# Patient Record
Sex: FEMALE | Race: WHITE | NOT HISPANIC OR LATINO | Employment: OTHER | URBAN - METROPOLITAN AREA
[De-identification: names, ages, dates, MRNs, and addresses within clinical notes are randomized per-mention and may not be internally consistent; named-entity substitution may affect disease eponyms.]

---

## 2023-03-10 LAB — URINE CULTURE: NO GROWTH

## 2023-03-20 ENCOUNTER — OFFICE VISIT (OUTPATIENT)
Dept: PRIMARY CARE | Facility: CLINIC | Age: 78
End: 2023-03-20
Payer: MEDICARE

## 2023-03-20 VITALS
OXYGEN SATURATION: 98 % | TEMPERATURE: 98.3 F | BODY MASS INDEX: 19.16 KG/M2 | HEART RATE: 71 BPM | WEIGHT: 111.6 LBS | SYSTOLIC BLOOD PRESSURE: 128 MMHG | DIASTOLIC BLOOD PRESSURE: 76 MMHG

## 2023-03-20 DIAGNOSIS — G50.0 TRIGEMINUS NEURALGIA: Primary | ICD-10-CM

## 2023-03-20 PROBLEM — K57.30 DIVERTICULOSIS OF COLON: Status: ACTIVE | Noted: 2023-03-20

## 2023-03-20 PROBLEM — I34.1 MITRAL VALVE PROLAPSE SYNDROME: Status: ACTIVE | Noted: 2023-03-20

## 2023-03-20 PROBLEM — Z86.19 HISTORY OF SHINGLES: Status: ACTIVE | Noted: 2023-03-20

## 2023-03-20 PROBLEM — E55.9 VITAMIN D DEFICIENCY: Status: ACTIVE | Noted: 2023-03-20

## 2023-03-20 PROBLEM — N39.3 STRESS INCONTINENCE IN FEMALE: Status: ACTIVE | Noted: 2023-03-20

## 2023-03-20 PROBLEM — H04.123 DRY EYE SYNDROME OF BOTH EYES: Status: ACTIVE | Noted: 2023-03-20

## 2023-03-20 PROBLEM — M85.9 DISORDER OF BONE DENSITY AND STRUCTURE, UNSPECIFIED: Status: ACTIVE | Noted: 2023-03-20

## 2023-03-20 PROBLEM — U07.1 COVID-19: Status: ACTIVE | Noted: 2023-03-20

## 2023-03-20 PROBLEM — D75.89 MACROCYTOSIS: Status: ACTIVE | Noted: 2023-03-20

## 2023-03-20 PROBLEM — R39.9 UTI SYMPTOMS: Status: ACTIVE | Noted: 2023-03-20

## 2023-03-20 PROBLEM — Z86.0100 HISTORY OF COLON POLYPS: Status: ACTIVE | Noted: 2023-03-20

## 2023-03-20 PROBLEM — Z86.010 HISTORY OF COLON POLYPS: Status: ACTIVE | Noted: 2023-03-20

## 2023-03-20 PROBLEM — I10 HYPERTENSION, ESSENTIAL: Status: ACTIVE | Noted: 2023-03-20

## 2023-03-20 PROBLEM — N95.2 POSTMENOPAUSAL ATROPHIC VAGINITIS: Status: ACTIVE | Noted: 2023-03-20

## 2023-03-20 PROBLEM — Z86.79 H/O SUPRAVENTRICULAR TACHYCARDIA: Status: ACTIVE | Noted: 2023-03-20

## 2023-03-20 PROBLEM — H26.9 CATARACT, ACQUIRED: Status: ACTIVE | Noted: 2023-03-20

## 2023-03-20 PROBLEM — N39.0 RECURRENT UTI: Status: ACTIVE | Noted: 2023-03-20

## 2023-03-20 PROBLEM — K64.0 GRADE I HEMORRHOIDS: Status: ACTIVE | Noted: 2023-03-20

## 2023-03-20 PROBLEM — M47.816 LUMBAR SPONDYLOSIS: Status: ACTIVE | Noted: 2023-03-20

## 2023-03-20 PROCEDURE — 1159F MED LIST DOCD IN RCRD: CPT | Performed by: NURSE PRACTITIONER

## 2023-03-20 PROCEDURE — 1036F TOBACCO NON-USER: CPT | Performed by: NURSE PRACTITIONER

## 2023-03-20 PROCEDURE — 3074F SYST BP LT 130 MM HG: CPT | Performed by: NURSE PRACTITIONER

## 2023-03-20 PROCEDURE — 3078F DIAST BP <80 MM HG: CPT | Performed by: NURSE PRACTITIONER

## 2023-03-20 PROCEDURE — 99213 OFFICE O/P EST LOW 20 MIN: CPT | Performed by: NURSE PRACTITIONER

## 2023-03-20 RX ORDER — FLAXSEED OIL 1000 MG
1000 CAPSULE ORAL
COMMUNITY

## 2023-03-20 RX ORDER — LANOLIN ALCOHOL/MO/W.PET/CERES
1000 CREAM (GRAM) TOPICAL DAILY
COMMUNITY
Start: 2019-08-28

## 2023-03-20 RX ORDER — ACETAMINOPHEN 500 MG
50 TABLET ORAL EVERY OTHER DAY
COMMUNITY

## 2023-03-20 RX ORDER — CRANBERRY FRUIT 450 MG
450 TABLET ORAL
COMMUNITY
Start: 2021-10-06 | End: 2023-11-07 | Stop reason: ALTCHOICE

## 2023-03-20 RX ORDER — CALCIUM CARBONATE/VITAMIN D3 500-10/5ML
30 LIQUID (ML) ORAL DAILY
COMMUNITY
Start: 2021-01-27

## 2023-03-20 RX ORDER — MULTIVITAMIN
1 TABLET ORAL DAILY
COMMUNITY
End: 2023-11-07 | Stop reason: ALTCHOICE

## 2023-03-20 RX ORDER — NITROFURANTOIN 25; 75 MG/1; MG/1
100 CAPSULE ORAL
COMMUNITY
Start: 2022-04-25 | End: 2023-10-17

## 2023-03-20 RX ORDER — ACETAMINOPHEN 160 MG/5ML
200 SUSPENSION, ORAL (FINAL DOSE FORM) ORAL
COMMUNITY
Start: 2021-10-06

## 2023-03-20 RX ORDER — ESTRADIOL 0.1 MG/G
CREAM VAGINAL
COMMUNITY
Start: 2019-03-14

## 2023-03-20 RX ORDER — IBUPROFEN 100 MG/5ML
1000 SUSPENSION, ORAL (FINAL DOSE FORM) ORAL DAILY
COMMUNITY
Start: 2021-10-06

## 2023-03-20 RX ORDER — METOPROLOL SUCCINATE 50 MG/1
50 TABLET, EXTENDED RELEASE ORAL DAILY
COMMUNITY
Start: 2013-01-16 | End: 2023-10-06

## 2023-03-20 RX ORDER — GLUTATHIONE 100 %
POWDER (GRAM) MISCELLANEOUS
COMMUNITY
Start: 2021-10-06

## 2023-03-20 RX ORDER — CARBAMAZEPINE 100 MG/1
100 TABLET, CHEWABLE ORAL 2 TIMES DAILY
Qty: 60 TABLET | Refills: 3 | Status: SHIPPED | OUTPATIENT
Start: 2023-03-20 | End: 2023-04-21 | Stop reason: SDUPTHER

## 2023-03-20 NOTE — PROGRESS NOTES
"Subjective   Patient ID: Joanna Kendrick is a 77 y.o. female who presents for veins in face (Nerve in face bothersome/From forehead down along side of nose/Feels like an electric shock/Had this for 2 years on and off/).  HPI  Nothing visible  Radiates lateral to medial L eyebrow  Down side nose   Stops just short of her lip  Exacerbated by brushes her teeth and hits roof of her mouth  Relieved by pressure point above her eyebrow    Review of Systems    BP Readings from Last 3 Encounters:   03/20/23 128/76   11/18/22 109/71   10/27/22 133/68      Wt Readings from Last 3 Encounters:   03/20/23 50.6 kg (111 lb 9.6 oz)   11/18/22 51 kg (112 lb 8 oz)   10/27/22 52.6 kg (116 lb)      BMI: Estimated body mass index is 19.16 kg/m² as calculated from the following:    Height as of 10/27/22: 1.626 m (5' 4\").    Weight as of this encounter: 50.6 kg (111 lb 9.6 oz).    Objective   Physical Exam  Constitutional:       Appearance: Normal appearance.   HENT:      Head: Normocephalic and atraumatic.      Right Ear: Tympanic membrane normal.      Left Ear: Tympanic membrane normal.      Nose: Nose normal.      Mouth/Throat:      Mouth: Mucous membranes are moist.      Pharynx: Oropharynx is clear.   Eyes:      Extraocular Movements: Extraocular movements intact.      Conjunctiva/sclera: Conjunctivae normal.      Pupils: Pupils are equal, round, and reactive to light.   Cardiovascular:      Rate and Rhythm: Normal rate and regular rhythm.      Heart sounds: Normal heart sounds.   Pulmonary:      Effort: Pulmonary effort is normal.      Breath sounds: Normal breath sounds.   Abdominal:      Palpations: Abdomen is soft.   Musculoskeletal:         General: Normal range of motion.      Cervical back: Normal range of motion and neck supple.   Skin:     General: Skin is warm and dry.   Neurological:      General: No focal deficit present.      Mental Status: She is alert.      Cranial Nerves: No cranial nerve deficit.      Sensory: No " sensory deficit.      Motor: No weakness.      Coordination: Coordination normal.      Gait: Gait normal.      Deep Tendon Reflexes: Reflexes normal.   Psychiatric:         Mood and Affect: Mood normal.         Behavior: Behavior normal.         Assessment/Plan   Problem List Items Addressed This Visit       Trigeminus neuralgia - Primary     Dr Osborne saw her for same 9/2022  At that time sx had resolved  Per Dr Osborne rec, if sx return can treat with tegretol  R/B tegretol discussed today  Will start low dose 100 mg every day then to BID if needed  Would also rec fu with neurology if ineffective          Relevant Medications    carBAMazepine (TEGretol) 100 mg chewable tablet    Other Relevant Orders    Referral to Neurology

## 2023-03-20 NOTE — ASSESSMENT & PLAN NOTE
Dr Osborne saw her for same 3/2021  At that time sx had resolved  Per Dr Osborne rec, if sx return can treat with tegretol  R/B tegretol discussed today  Will start low dose 100 mg every day then to BID if needed  Would also rec fu with neurology if ineffective

## 2023-03-21 ENCOUNTER — TELEPHONE (OUTPATIENT)
Dept: PRIMARY CARE | Facility: CLINIC | Age: 78
End: 2023-03-21
Payer: MEDICARE

## 2023-03-21 NOTE — TELEPHONE ENCOUNTER
Pt left VM  Pt saw DG yesterday for nerve pain in face and was given Carbamazepine 100mg BID  Pharmacy did not have it this  yesterday but they are supposed to get it in this morning  Pt stated she is in a lot of pain- can not chew and wants to know if this medication will work quickly to relieve the pain  Please advise

## 2023-03-21 NOTE — TELEPHONE ENCOUNTER
Spoke with Pt    Pt said she got the RX and is feeling some relief  Pt stated she still needs help scheduling with the Neurologist  Pt stated yesterday they ( the neurologists) office was unable to help with scheduling while she was here and they never called her and she can not get through to them now.  Could you please call her [phone] and help her schedule with the neurologists office. She said she would prefer the female dr. PUENTE

## 2023-04-21 DIAGNOSIS — G50.0 TRIGEMINUS NEURALGIA: ICD-10-CM

## 2023-04-21 RX ORDER — CARBAMAZEPINE 100 MG/1
TABLET, CHEWABLE ORAL
Qty: 90 TABLET | Refills: 3 | Status: SHIPPED | OUTPATIENT
Start: 2023-04-21 | End: 2024-04-30 | Stop reason: SDUPTHER

## 2023-06-09 ENCOUNTER — TELEPHONE (OUTPATIENT)
Dept: PRIMARY CARE | Facility: CLINIC | Age: 78
End: 2023-06-09
Payer: MEDICARE

## 2023-06-09 NOTE — TELEPHONE ENCOUNTER
Pt called and stated her old referral /order for colonoscopy is   Asked for a new one to be placed  formatted

## 2023-06-15 ENCOUNTER — TELEPHONE (OUTPATIENT)
Dept: PRIMARY CARE | Facility: CLINIC | Age: 78
End: 2023-06-15
Payer: MEDICARE

## 2023-06-15 DIAGNOSIS — Z12.11 SCREENING FOR MALIGNANT NEOPLASM OF COLON: Primary | ICD-10-CM

## 2023-06-15 PROBLEM — Z00.00 ROUTINE ADULT HEALTH MAINTENANCE: Status: ACTIVE | Noted: 2023-06-15

## 2023-06-15 NOTE — TELEPHONE ENCOUNTER
Called and left a voicemail informing patient the referral has been placed and requested she call for assistance with scheduling if needed

## 2023-07-19 LAB — URINE CULTURE: NO GROWTH

## 2023-08-24 ENCOUNTER — HOSPITAL ENCOUNTER (OUTPATIENT)
Dept: DATA CONVERSION | Facility: HOSPITAL | Age: 78
End: 2023-08-24
Attending: PAIN MEDICINE | Admitting: PAIN MEDICINE
Payer: MEDICARE

## 2023-08-24 DIAGNOSIS — G50.0 TRIGEMINAL NEURALGIA: ICD-10-CM

## 2023-08-29 PROBLEM — N39.0 UTI (URINARY TRACT INFECTION): Status: ACTIVE | Noted: 2023-08-29

## 2023-08-29 PROBLEM — E78.2 HYPERLIPIDEMIA, MIXED: Status: ACTIVE | Noted: 2023-08-29

## 2023-08-29 PROBLEM — H61.23 BILATERAL IMPACTED CERUMEN: Status: ACTIVE | Noted: 2023-08-29

## 2023-08-29 PROBLEM — I10 HYPERTENSION, ESSENTIAL: Status: RESOLVED | Noted: 2023-03-20 | Resolved: 2023-08-29

## 2023-08-29 PROBLEM — B02.9 SHINGLES: Status: ACTIVE | Noted: 2023-08-29

## 2023-08-29 PROBLEM — M85.80 OSTEOPENIA: Status: ACTIVE | Noted: 2023-08-29

## 2023-08-29 PROBLEM — Z79.2 PROPHYLACTIC ANTIBIOTIC: Status: ACTIVE | Noted: 2023-08-29

## 2023-08-29 PROBLEM — R30.0 DYSURIA: Status: ACTIVE | Noted: 2023-08-29

## 2023-08-29 PROBLEM — R79.89 ELEVATED TSH: Status: ACTIVE | Noted: 2023-08-29

## 2023-08-29 RX ORDER — CYCLOSPORINE 0.5 MG/ML
EMULSION OPHTHALMIC
COMMUNITY
Start: 2019-01-23 | End: 2023-10-17

## 2023-08-29 RX ORDER — BENZONATATE 200 MG/1
200 CAPSULE ORAL 3 TIMES DAILY PRN
COMMUNITY
Start: 2022-11-23 | End: 2023-10-17

## 2023-08-29 RX ORDER — MELOXICAM 15 MG/1
15 TABLET ORAL
COMMUNITY
Start: 2021-04-09 | End: 2023-11-07 | Stop reason: ALTCHOICE

## 2023-08-29 RX ORDER — CIPROFLOXACIN 250 MG/1
250 TABLET, FILM COATED ORAL EVERY 12 HOURS
COMMUNITY
Start: 2023-02-10 | End: 2023-10-17

## 2023-08-29 RX ORDER — NITROFURANTOIN (MACROCRYSTALS) 100 MG/1
100 CAPSULE ORAL 2 TIMES DAILY
COMMUNITY
Start: 2023-02-06 | End: 2023-10-17

## 2023-08-29 RX ORDER — CIPROFLOXACIN 500 MG/1
1 TABLET ORAL EVERY 12 HOURS
COMMUNITY
Start: 2022-04-22 | End: 2023-10-17

## 2023-08-29 RX ORDER — AZITHROMYCIN 250 MG/1
TABLET, FILM COATED ORAL
COMMUNITY
Start: 2022-10-27 | End: 2023-10-17

## 2023-08-29 RX ORDER — CLINDAMYCIN HYDROCHLORIDE 300 MG/1
CAPSULE ORAL
COMMUNITY
Start: 2014-11-04 | End: 2023-10-17

## 2023-08-29 RX ORDER — SELENIUM SULFIDE 22.5 MG/ML
SHAMPOO TOPICAL
COMMUNITY
Start: 2022-10-27 | End: 2023-11-07 | Stop reason: ALTCHOICE

## 2023-08-29 RX ORDER — DIAZEPAM 2 MG/1
TABLET ORAL
COMMUNITY
Start: 2023-04-03 | End: 2023-10-17

## 2023-08-29 RX ORDER — NITROFURANTOIN (MACROCRYSTALS) 100 MG/1
CAPSULE ORAL
COMMUNITY
Start: 2014-10-31 | End: 2023-10-17

## 2023-08-29 RX ORDER — ZOLEDRONIC ACID 5 MG/100ML
INJECTION, SOLUTION INTRAVENOUS
COMMUNITY
Start: 2022-01-10

## 2023-08-29 RX ORDER — CEPHALEXIN 250 MG/1
250 CAPSULE ORAL
COMMUNITY
Start: 2022-10-20 | End: 2023-10-17

## 2023-10-01 NOTE — OP NOTE
PROCEDURE DETAILS      Postoperative Diagnosis:  Trigeminal neuralgia  Surgeon: Ann Braga  Resident/Fellow/Other Assistant: Nirali Medrano    Procedure:  1. Left TRIGEMINAL NB no sed g50.0 43856    Anesthesia: No anesthesiologist associated with this case  Estimated Blood Loss: 0  Findings: none         Operative Report:     Operation  Trigeminal nerve block.    Surgical Indications  Patient is here today to receive a left-sided trigeminal nerve block to assist with left-sided trigeminal neuralgia.    Operative Report  Patient was taken to the procedure room, was placed into the supine positioning. The [variable] facial area was prepped and draped sterilely in the normal fashion. The skin and subcutaneous tissue was anesthetized with 1% lidocaine at the site of the  mandibular notch. A 22-gauge spinal needle was introduced under fluoroscopic guidance, through the mandibular notch until it was in approximation to the trigeminal ganglia. Negative aspiration of heme and CSF, and air was demonstrated. Then the patient  received 3 mL of 0.5% bupivacaine with 20 mg of Depo-Medrol. The patient tolerated the procedure well, was recovered first a sufficient amount of time and then was discharged home in stable condition. The patient was advised to [facility] to assess their improvement on an as-needed basis.    Thank you for allowing me to participate in the care of this patient.                        Attestation:   Note Completion:  Attending Attestation I performed the procedure without a resident         Electronic Signatures:  Ann Braga)  (Signed 24-Aug-2023 14:23)   Authored: Post-Operative Note, Chart Review, Note Completion      Last Updated: 24-Aug-2023 14:23 by Ann Braga)

## 2023-10-02 ENCOUNTER — ANCILLARY PROCEDURE (OUTPATIENT)
Dept: RADIOLOGY | Facility: CLINIC | Age: 78
End: 2023-10-02
Payer: MEDICARE

## 2023-10-02 DIAGNOSIS — Z13.820 ENCOUNTER FOR SCREENING FOR OSTEOPOROSIS: ICD-10-CM

## 2023-10-02 DIAGNOSIS — Z78.0 ASYMPTOMATIC MENOPAUSAL STATE: ICD-10-CM

## 2023-10-02 PROCEDURE — 77080 DXA BONE DENSITY AXIAL: CPT | Performed by: RADIOLOGY

## 2023-10-02 PROCEDURE — 77080 DXA BONE DENSITY AXIAL: CPT

## 2023-10-05 ENCOUNTER — ANESTHESIA EVENT (OUTPATIENT)
Dept: GASTROENTEROLOGY | Facility: EXTERNAL LOCATION | Age: 78
End: 2023-10-05

## 2023-10-05 ENCOUNTER — ANESTHESIA (OUTPATIENT)
Dept: GASTROENTEROLOGY | Facility: EXTERNAL LOCATION | Age: 78
End: 2023-10-05

## 2023-10-05 ENCOUNTER — HOSPITAL ENCOUNTER (OUTPATIENT)
Dept: GASTROENTEROLOGY | Facility: EXTERNAL LOCATION | Age: 78
Discharge: HOME | End: 2023-10-05
Payer: MEDICARE

## 2023-10-05 VITALS
SYSTOLIC BLOOD PRESSURE: 130 MMHG | OXYGEN SATURATION: 100 % | HEART RATE: 80 BPM | TEMPERATURE: 97.7 F | DIASTOLIC BLOOD PRESSURE: 75 MMHG | RESPIRATION RATE: 20 BRPM

## 2023-10-05 DIAGNOSIS — Z86.79 PERSONAL HISTORY OF OTHER DISEASES OF THE CIRCULATORY SYSTEM: ICD-10-CM

## 2023-10-05 DIAGNOSIS — Z12.11 ENCOUNTER FOR SCREENING FOR MALIGNANT NEOPLASM OF COLON: Primary | ICD-10-CM

## 2023-10-05 DIAGNOSIS — D12.2 ADENOMATOUS POLYP OF ASCENDING COLON: ICD-10-CM

## 2023-10-05 PROCEDURE — 45378 DIAGNOSTIC COLONOSCOPY: CPT | Performed by: INTERNAL MEDICINE

## 2023-10-05 RX ORDER — LIDOCAINE HYDROCHLORIDE 20 MG/ML
INJECTION, SOLUTION INFILTRATION; PERINEURAL AS NEEDED
Status: DISCONTINUED | OUTPATIENT
Start: 2023-10-05 | End: 2023-10-05

## 2023-10-05 RX ORDER — SODIUM CHLORIDE 9 MG/ML
30 INJECTION, SOLUTION INTRAVENOUS CONTINUOUS
Status: DISCONTINUED | OUTPATIENT
Start: 2023-10-05 | End: 2023-10-20 | Stop reason: HOSPADM

## 2023-10-05 RX ORDER — PROPOFOL 10 MG/ML
INJECTION, EMULSION INTRAVENOUS AS NEEDED
Status: DISCONTINUED | OUTPATIENT
Start: 2023-10-05 | End: 2023-10-05

## 2023-10-05 RX ADMIN — SODIUM CHLORIDE: 9 INJECTION, SOLUTION INTRAVENOUS at 08:16

## 2023-10-05 RX ADMIN — PROPOFOL 100 MG: 10 INJECTION, EMULSION INTRAVENOUS at 08:19

## 2023-10-05 RX ADMIN — PROPOFOL 50 MG: 10 INJECTION, EMULSION INTRAVENOUS at 08:30

## 2023-10-05 RX ADMIN — LIDOCAINE HYDROCHLORIDE 50 MG: 20 INJECTION, SOLUTION INFILTRATION; PERINEURAL at 08:19

## 2023-10-05 RX ADMIN — PROPOFOL 100 MG: 10 INJECTION, EMULSION INTRAVENOUS at 08:24

## 2023-10-05 SDOH — HEALTH STABILITY: MENTAL HEALTH: CURRENT SMOKER: 0

## 2023-10-05 ASSESSMENT — PAIN - FUNCTIONAL ASSESSMENT
PAIN_FUNCTIONAL_ASSESSMENT: 0-10

## 2023-10-05 ASSESSMENT — PAIN SCALES - GENERAL
PAIN_LEVEL: 0
PAINLEVEL_OUTOF10: 0 - NO PAIN

## 2023-10-05 ASSESSMENT — COLUMBIA-SUICIDE SEVERITY RATING SCALE - C-SSRS
2. HAVE YOU ACTUALLY HAD ANY THOUGHTS OF KILLING YOURSELF?: NO
6. HAVE YOU EVER DONE ANYTHING, STARTED TO DO ANYTHING, OR PREPARED TO DO ANYTHING TO END YOUR LIFE?: NO
1. IN THE PAST MONTH, HAVE YOU WISHED YOU WERE DEAD OR WISHED YOU COULD GO TO SLEEP AND NOT WAKE UP?: NO

## 2023-10-05 NOTE — ANESTHESIA POSTPROCEDURE EVALUATION
Patient: Joanna Kendrick    Procedure Summary       Date: 10/05/23 Room / Location: [facility]    Anesthesia Start: 0816 Anesthesia Stop: 0843    Procedure: COLONOSCOPY Diagnosis:       Encounter for screening for malignant neoplasm of colon      Encounter for screening for malignant neoplasm of colon    Scheduled Providers: Lia Emerson MD; Leann Cook RN Responsible Provider: SUNI Quijano    Anesthesia Type: MAC ASA Status: 2            Anesthesia Type: MAC    Vitals Value Taken Time   /59 10/05/23 0845   Temp 36.5 10/05/23 0845   Pulse 77 10/05/23 0845   Resp 20 10/05/23 0845   SpO2 100 10/05/23 0845       Anesthesia Post Evaluation    Patient location during evaluation: PACU  Patient participation: complete - patient participated  Level of consciousness: awake and alert  Pain score: 0  Pain management: satisfactory to patient  Multimodal analgesia pain management approach  Airway patency: patent  Cardiovascular status: acceptable and blood pressure returned to baseline  Respiratory status: acceptable  Hydration status: acceptable        There were no known notable events for this encounter.

## 2023-10-05 NOTE — Clinical Note
Patient tolerating procedure well and is comfortable with no complaints of pain. Vital signs stable.

## 2023-10-05 NOTE — DISCHARGE INSTRUCTIONS
Patient Instructions after a Colonoscopy      The anesthetics, sedatives or narcotics which were given to you today will be acting in your body for the next 24 hours, so you might feel a little sleepy or groggy.  This feeling should slowly wear off. Carefully read and follow the instructions.     You received sedation today:  - Do not drive or operate any machinery or power tools of any kind.   - No alcoholic beverages today, not even beer or wine.  - Do not make any important decisions or sign any legal documents.  - No over the counter medications that contain alcohol or that may cause drowsiness.  - Do not make any important decisions or sign any legal documents.    While it is common to experience mild to moderate abdominal distention, gas, or belching after your procedure, if any of these symptoms occur following discharge from the GI Lab or within one week of having your procedure, call the [facility] to be advised whether a visit to your nearest Urgent Care or Emergency Department is indicated.  Take this paper with you if you go.     - If you develop an allergic reaction to the medications that were given during your procedure such as difficulty breathing, rash, hives, severe nausea, vomiting or lightheadedness.  - If you experience chest pain, shortness of breath, severe abdominal pain, fevers and chills.  -If you develop signs and symptoms of bleeding such as blood in your spit, if your stools turn black, tarry, or bloody  - If you have not urinated within 8 hours following your procedure.  - If your IV site becomes painful, red, inflamed, or looks infected.    If you received a biopsy/polypectomy/sphincterotomy the following instructions apply below:    __ Do not use Aspirin containing products, non-steroidal medications or anti-coagulants for one week following your procedure. (Examples of these types of medications are: Advil, Arthrotec, Aleve, Coumadin, Ecotrin, Heparin, Ibuprofen,  Indocin, Motrin, Naprosyn, Nuprin, Plavix, Vioxx, and Voltarin, or their generic forms.  This list is not all-inclusive.  Check with your physician or pharmacist before resuming medications.)   __ Eat a soft diet today.  Avoid foods that are poorly digested for the next 24 hours.  These foods would include: nuts, beans, lettuce, red meats, and fried foods. Start with liquids and advance your diet as tolerated, gradually work up to eating solids.   __ Do not have a Barium Study or Enema for one week.    Your physician recommends the additional following instructions:    -You have a contact number available for emergencies. The signs and symptoms of potential delayed complications were discussed with you. You may return to normal activities tomorrow.  -Resume your previous diet.  -Continue your present medications.   -We are waiting for your pathology results.  -Your physician has recommended a repeat colonoscopy (date to be determined after pending pathology results are reviewed) for surveillance based on pathology results.  -The findings and recommendations have been discussed with you.  -The findings and recommendations were discussed with your family.  - Please see Medication Reconciliation Form for new medication/medications prescribed.       If you experience any problems or have any questions following discharge from the GI Lab, please call:  [phone]    No driving, no heavy machinery or power tools for 24 hours post procedure  No alcohol beverages today  No medications that contain alcohol or can make you groggy  No major life decisions today or signing legal documents today  Return to normal activity tomorrow  [facility] phone number [phone]

## 2023-10-05 NOTE — ANESTHESIA PREPROCEDURE EVALUATION
Patient: Joanna Kendrick    Procedure Information       Date/Time: 10/05/23 0800    Scheduled providers: Lia Emerson MD; Leann Cook RN    Procedure: COLONOSCOPY    Location: [facility]            Relevant Problems   Cardiovascular   (+) Hyperlipidemia, mixed   (+) Mitral valve prolapse syndrome   (+) SVT (supraventricular tachycardia)      /Renal   (+) Recurrent UTI      Neuro/Psych   (+) Trigeminus neuralgia      Musculoskeletal   (+) Lumbar spondylosis      Infectious Disease   (+) COVID-19   (+) Recurrent UTI   (+) Shingles       Clinical information reviewed:                   NPO Detail:  No data recorded     Physical Exam    Airway  Mallampati: II  TM distance: >3 FB  Neck ROM: full     Cardiovascular - normal exam     Dental   (+) implants       Pulmonary - normal exam     Abdominal - normal exam             Anesthesia Plan    ASA 2     MAC     The patient is not a current smoker.    Anesthetic plan and risks discussed with patient.  Use of blood products discussed with patient who consented to blood products.    Plan discussed with CRNA.

## 2023-10-05 NOTE — H&P
Outpatient Hospital Procedure    Patient Profile-Procedures  Initial Info  Patient Demographics  Name Joanna Kendrick  Date of Birth 1945  MRN [id number]  Address   [address] [id number] [place] [address]    Primary Phone Number [phone]  Secondary Phone Number    Jessa Art    Procedures   Colonoscopy      Indication:  Surveillance    Primary contact name and number   Extended Emergency Contact Information  Primary Emergency Contact: Pia Conway  Home Phone: [phone]  Relation: Child    General Health  Weight There were no vitals filed for this visit.  BMI There is no height or weight on file to calculate BMI.    Allergies  Allergies   Allergen Reactions    Penicillin G Anaphylaxis    Penicillins Anaphylaxis    Cipro [Ciprofloxacin Hcl] Unknown    Clindamycin Unknown    Sulfa (Sulfonamide Antibiotics) Hives       Past Medical History   Past Medical History:   Diagnosis Date    H/O bone density study 10/02/2023    Lowest T score -1.8.    10-year Fracture Risk: Major Osteoporotic Fracture  19.6 Hip Fracture                        11.6    Personal history of other medical treatment     H/O x-ray of lumbar spine    Personal history of other medical treatment     H/O CT scan    Personal history of other medical treatment     H/O echocardiogram    Personal history of other medical treatment     H/O CT scan of abdomen       Provider assessment  Diagnosis  Medication Reviewed - yes  Prior to Admission medications    Medication Sig Start Date End Date Taking? Authorizing Provider   ascorbic acid (Vitamin C) 1,000 mg tablet Take 1 tablet (1,000 mg) by mouth once daily. 10/6/21   Historical Provider, MD   aspirin 81 mg capsule Take 1 tablet by mouth once daily.    Historical Provider, MD   azithromycin (Zithromax) 250 mg tablet Take by mouth. 10/27/22   Historical Provider, MD   benzonatate (Tessalon) 200 mg capsule Take 1 capsule (200 mg) by mouth 3 times a  day as needed for cough. 11/23/22   Historical Provider, MD   carBAMazepine (TEGretol) 100 mg chewable tablet Take 1 tablet in the morning and take 2 tablets HS 4/21/23   Radhika Whitehead APRN-CNP   cephalexin (Keflex) 250 mg capsule Take 1 capsule (250 mg) by mouth. 10/20/22   Historical Provider, MD   cholecalciferol (Vitamin D-3) 50 mcg (2,000 unit) capsule Take 1 capsule (50 mcg) by mouth every other day.    Historical Provider, MD   ciprofloxacin (Cipro) 250 mg tablet Take 1 tablet (250 mg) by mouth every 12 hours. 2/10/23   Historical Provider, MD   ciprofloxacin (Cipro) 500 mg tablet Take 1 tablet (500 mg) by mouth every 12 hours. 4/22/22   Historical Provider, MD   clindamycin (Cleocin) 300 mg capsule Take by mouth. 11/4/14   Historical Provider, MD   coenzyme Q-10 200 mg capsule Take 1 capsule (200 mg) by mouth. TAKE AS DIRECTED. 10/6/21   Historical Provider, MD   cranberry fruit (cranberry) 450 mg tablet Take 450 mg by mouth. TAKE AS DIRECTED. 10/6/21   Historical Provider, MD   cranberry fruit concentrate 500 mg capsule Take by mouth.    Historical Provider, MD   cyanocobalamin (Vitamin B-12) 1,000 mcg tablet Take 1 tablet (1,000 mcg) by mouth once daily. 8/28/19   Historical Provider, MD   cycloSPORINE (Restasis MultiDose) 0.05 % drops Administer into affected eye(s). 1/23/19   Historical Provider, MD   diazePAM (Valium) 2 mg tablet Take by mouth. 4/3/23   Historical Provider, MD   estradiol (Estrace) 0.01 % (0.1 mg/gram) vaginal cream Insert into the vagina. APPLY A PEA-SIZED AMOUNT TO VAGINAL OPENING EVERY MONDAY, WEDNESDAY, AND FRIDAY EVENING. 3/14/19   Historical Provider, MD   flaxseed oiL 1,000 mg capsule Take 1 capsule (1,000 mg) by mouth. TAKE AS DIRECTED.    Historical Provider, MD   GLUTATHIONE ORAL Take 250 mg/day by mouth.    Historical Provider, MD   glutathione, bulk, 100 % powder Take by mouth. CAPSULE. USE AS DIRECTED. 10/6/21   Historical Provider, MD   hydrocortisone (PROCTOSOL HC TOP)  Apply 1 Application topically 4 times a day as needed.    Historical Provider, MD   L. acidophilus/Bifid. animalis 32 billion cell capsule Take by mouth.    Historical Provider, MD   LACTOBACILLUS ACIDOPHILUS ORAL Take by mouth.    Historical Provider, MD   meloxicam (Mobic) 15 mg tablet Take 1 tablet (15 mg) by mouth once daily. 4/9/21   Historical Provider, MD   METHYLSULFONYLMETHANE ORAL Take 1 Dose by mouth.    Historical Provider, MD   metoprolol succinate XL (Toprol-XL) 50 mg 24 hr tablet Take 1 tablet (50 mg) by mouth once daily. 1/16/13   Historical Provider, MD   multivitamin capsule Take 1 capsule by mouth once daily.    Historical Provider, MD   multivitamin with minerals (multivitamin with folic acid) tablet Take 1 tablet by mouth once daily.    Historical Provider, MD   nitrofurantoin (Macrodantin) 100 mg capsule Take 1 capsule (100 mg) by mouth 2 times a day. 2/6/23   Historical Provider, MD   nitrofurantoin (Macrodantin) 100 mg capsule Take by mouth. 10/31/14   Historical Provider, MD   nitrofurantoin, macrocrystal-monohydrate, (Macrobid) 100 mg capsule Take 1 capsule (100 mg) by mouth. Take one capsule within 12 hours of intercourse 4/25/22   Historical Provider, MD   NON FORMULARY Marine lipid concentrate 240-360-5 MG-MG-Unit caps    Historical Provider, MD   NON FORMULARY Cylospine in [place] 0.1-25% compound BID    Historical Provider, MD   psyllium (Metamucil) 3.4 gram packet Take by mouth. 10/6/21   Historical Provider, MD   selenium sulfide 2.25 % shampoo Use twice weekly 10/27/22   Historical Provider, MD   zinc gluconate-zinc picolinate 30 mg capsule Take 30 mg by mouth once daily. 1/27/21   Historical Provider, MD   zoledronic acid (Reclast) 5 mg/100 mL piggyback Infuse into a venous catheter. 1/10/22   Historical Provider, MD       This is my H&P    Physical Exam  Physical Exam  Constitutional:       Comments: Awake   HENT:      Head: Normocephalic.   Cardiovascular:      Rate and Rhythm:  Normal rate and regular rhythm.   Pulmonary:      Effort: Pulmonary effort is normal.      Breath sounds: Normal breath sounds.   Abdominal:      General: Bowel sounds are normal.      Palpations: Abdomen is soft.   Neurological:      Mental Status: She is alert.   Psychiatric:         Mood and Affect: Mood normal.           Oropharyngeal Classification II (hard and soft palate, upper portion of tonsils anduvula visible)  ASA PS Classification 2  Sedation Plan Deep  Procedure Plan - pre-procedural (re)assesment completed by physician:  discharge/transfer patient when discharge criteria met    Lia Emerson MD  10/5/2023 8:08 AM

## 2023-10-05 NOTE — Clinical Note
Patient tolerated the procedure well and is comfortable with no complaints of pain. Vital signs stable. Arousable prior to transport. Patient transported to PACU via stretcher. Handoff completed.

## 2023-10-06 RX ORDER — METOPROLOL SUCCINATE 50 MG/1
50 TABLET, EXTENDED RELEASE ORAL DAILY
Qty: 90 TABLET | Refills: 3 | Status: SHIPPED | OUTPATIENT
Start: 2023-10-06

## 2023-10-09 DIAGNOSIS — G50.0 TRIGEMINUS NEURALGIA: Primary | ICD-10-CM

## 2023-10-17 ENCOUNTER — HOSPITAL ENCOUNTER (OUTPATIENT)
Dept: RADIOLOGY | Facility: HOSPITAL | Age: 78
Discharge: HOME | End: 2023-10-17
Payer: MEDICARE

## 2023-10-17 ENCOUNTER — HOSPITAL ENCOUNTER (OUTPATIENT)
Dept: PAIN MEDICINE | Facility: CLINIC | Age: 78
Discharge: HOME | End: 2023-10-17
Payer: MEDICARE

## 2023-10-17 VITALS
DIASTOLIC BLOOD PRESSURE: 57 MMHG | RESPIRATION RATE: 20 BRPM | SYSTOLIC BLOOD PRESSURE: 113 MMHG | HEART RATE: 70 BPM | OXYGEN SATURATION: 99 % | TEMPERATURE: 98.2 F

## 2023-10-17 DIAGNOSIS — G50.0 TRIGEMINUS NEURALGIA: Primary | ICD-10-CM

## 2023-10-17 PROCEDURE — 2500000004 HC RX 250 GENERAL PHARMACY W/ HCPCS (ALT 636 FOR OP/ED): Performed by: PAIN MEDICINE

## 2023-10-17 PROCEDURE — 64640 INJECTION TREATMENT OF NERVE: CPT | Performed by: PAIN MEDICINE

## 2023-10-17 PROCEDURE — 7100000009 HC PHASE TWO TIME - INITIAL BASE CHARGE: Performed by: PAIN MEDICINE

## 2023-10-17 PROCEDURE — 76000 FLUOROSCOPY <1 HR PHYS/QHP: CPT

## 2023-10-17 PROCEDURE — G0500 MOD SEDAT ENDO SERVICE >5YRS: HCPCS | Performed by: PAIN MEDICINE

## 2023-10-17 PROCEDURE — 2500000005 HC RX 250 GENERAL PHARMACY W/O HCPCS

## 2023-10-17 PROCEDURE — 2500000004 HC RX 250 GENERAL PHARMACY W/ HCPCS (ALT 636 FOR OP/ED)

## 2023-10-17 PROCEDURE — 7100000010 HC PHASE TWO TIME - EACH INCREMENTAL 1 MINUTE: Performed by: PAIN MEDICINE

## 2023-10-17 PROCEDURE — 2500000005 HC RX 250 GENERAL PHARMACY W/O HCPCS: Performed by: PAIN MEDICINE

## 2023-10-17 RX ORDER — BUPIVACAINE HYDROCHLORIDE 5 MG/ML
10 INJECTION, SOLUTION PERINEURAL ONCE
Status: COMPLETED | OUTPATIENT
Start: 2023-10-17 | End: 2023-10-17

## 2023-10-17 RX ORDER — LIDOCAINE HYDROCHLORIDE 10 MG/ML
10 INJECTION INFILTRATION; PERINEURAL ONCE
Status: COMPLETED | OUTPATIENT
Start: 2023-10-17 | End: 2023-10-17

## 2023-10-17 RX ORDER — MIDAZOLAM HYDROCHLORIDE 1 MG/ML
1 INJECTION INTRAMUSCULAR; INTRAVENOUS
Status: DISCONTINUED | OUTPATIENT
Start: 2023-10-17 | End: 2023-10-20 | Stop reason: HOSPADM

## 2023-10-17 RX ADMIN — MIDAZOLAM HYDROCHLORIDE 1 MG: 1 INJECTION INTRAMUSCULAR; INTRAVENOUS at 10:46

## 2023-10-17 RX ADMIN — BUPIVACAINE HYDROCHLORIDE 50 MG: 5 INJECTION, SOLUTION PERINEURAL at 10:44

## 2023-10-17 RX ADMIN — LIDOCAINE HYDROCHLORIDE 100 MG: 10 INJECTION, SOLUTION INFILTRATION; PERINEURAL at 10:45

## 2023-10-17 NOTE — DISCHARGE INSTRUCTIONS
Resume previous diet  Resume previous medications unless otherwise directed by Dr. Braga  No tub bath or shower for 24 hours  Normal activity permitted.  Do not strain yourself. It is not unusual to feel increased pain at first.  This usually subsides in a few days.  Diabetic patients may notice increased blood sugar readings for a few days post-procedure. Check blood sugars per your usual schedule. Notify primary care doctor if readings do not return to normal 3-4 days after your procedure.  You may remove bandage after 24hrs.  Tenderness at the procedure site may be relieved with application of ice on and off for first 24 hours.  Then, ice or heat thereafter.  The deep anesthetic that was injected may cause numbness or weakness in an extremity.  Notify physician if you develop fever or chills greater than 100°F; persistent extremity numbness or weakness; nausea or bleeding at injection site (small spotting is normal)  Call 911 immediately if you develop difficulty breathing or shortness of breath   In case of any other emergency, go directly to the emergency department.     Radiofrequency Ablation for Pain    Why is this procedure done?  Radiofrequency ablation is a procedure that uses electrical current to destroy nerves that are causing pain. This may be used when other treatments have failed to give lasting relief. Heat, cool, or drugs may also be used to help destroy the nerves. This procedure stops the nerve from sending pain signals to the brain. Doctors may suggest radiofrequency ablation to treat pain in your back, hips, knees, or neck if your pain is not eased by other treatments.  What will the results be?  The goal is that you will have less pain after the procedure.  What happens before the procedure?  Your doctor will take your history. Talk to your doctor about:  All the drugs you are taking. Be sure to include all prescription and over-the-counter (OTC) drugs and herbal supplements. Tell the  doctor about any drug allergy. Bring a list of drugs you take with you.  Any bleeding problems. Be sure to tell your doctor if you are taking any drugs that may cause bleeding. Some of these are warfarin, rivaroxaban, apixaban, ticagrelor, clopidogrel, ketorolac, ibuprofen, naproxen, or aspirin. Certain vitamins and herbs, such as garlic and fish oil, may also add to the risk for bleeding. You may need to stop these drugs as well. Talk to your doctor about them.  If you need to stop eating or drinking before your procedure.  Your doctor will do an exam and may order:  Lab tests  X-rays  MRI or CT scan  Ultrasound  Electrocardiogram (ECG)  You may not be allowed to drive right away after the procedure. Ask a family member or a friend to drive you home.  What happens during the procedure?  Once you are in the procedure room, the staff may put an IV in your arm to give you fluids and drugs. You may be given a drug to help you relax.  The doctor will clean and numb the area.  The doctor may use a special kind of x-ray to guide a thin needle into the area where you feel pain. You may feel a slight tingling. This lets the doctor know they are in the right area.  Next, the doctor places a thin electrode through the needle and destroys a small amount of the nerve tissue.  Then the doctor takes out the needle and electrode and covers the area with a bandage.  What happens after the procedure?  You will go to the Recovery Room and the staff will watch you closely. Most often you are able to go home the same day.  What problems could happen?  Bleeding  Infection  Weakness or numbness  Nerve damage  Last Reviewed Date  2021-11-16

## 2023-10-17 NOTE — H&P
History Of Present Illness  Joanna Kendrick is a 78 y.o. female presenting with trigeminal neuralgia here today for radiofrequency ablation of the left trigeminal nerve     Past Medical History  Past Medical History:   Diagnosis Date    H/O bone density study 10/02/2023    Lowest T score -1.8.    10-year Fracture Risk: Major Osteoporotic Fracture  19.6 Hip Fracture                        11.6    Personal history of other medical treatment     H/O x-ray of lumbar spine    Personal history of other medical treatment     H/O CT scan    Personal history of other medical treatment     H/O echocardiogram    Personal history of other medical treatment     H/O CT scan of abdomen       Surgical History  Past Surgical History:   Procedure Laterality Date    BLADDER SURGERY  2014    Bladder Surgery    CATARACT EXTRACTION  08/15/2018    Cataract Surgery    COLONOSCOPY  2022    Complete Colonoscopy    GALLBLADDER SURGERY  10/04/2013    Gallbladder Surgery    HEMICOLECTOMY  08/15/2018    Hemicolectomy    HERNIA REPAIR  08/15/2018    Hernia Repair    OTHER SURGICAL HISTORY  2014    Mid-Urethral Sling Operation    OTHER SURGICAL HISTORY  2019    Blepharoplasty    OTHER SURGICAL HISTORY  2014    Laparoscopy Partial Colectomy        Social History  She reports that she has never smoked. She has never used smokeless tobacco. She reports that she does not drink alcohol. No history on file for drug use.    Family History  Family History   Problem Relation Name Age of Onset    Other (Pacemaker) Mother           age 92    Hip fracture Mother          s/p fall down stairs    Other (MVA-  young age) Father      Alcohol abuse Brother      Pancreatic cancer Brother           70    Cirrhosis Brother      Colon cancer Half-Sister          Allergies  Penicillin g, Penicillins, Cipro [ciprofloxacin hcl], Clindamycin, and Sulfa (sulfonamide antibiotics)    Review of Systems   All 13 systems were reviewed  and are within normal levels except as noted below or per HPI. Positive and pertinent negative responses are noted below or in the HPI   Denied any fever or chills. No weight loss and no night sweats. No cough or sputum production. No diarrhea   No constipation  No bladder and bowel incontinence and no other changes in bladder and bowel. No skin changes.   Denied opioids diversion and abuse and denies alcoholism. Denies overuse of  pain medications.   Physical Exam       Past medical history no interval changes has been noted    On physical examination    General   Alert, oriented x3 pleasant and cooperative. Does not look in any major distress.    HEENT  Pupils normal in size. Ears, nose, mouth, and throat appear to be in normal condition.  Head atraumatic      No signs of sedation or signs of withdrawal apparent.    Psychiatric   No signs of depression apparent.    Neuro   No focal neurological deficit apparent. Ambulation at baseline.      Respiratory  No respiratory distress     Abdomen  no distention     Skin  No skin markings supportive of recent IV drug usage .    Cardiovascular  Regular rate and rhythm    Last Recorded Vitals  Blood pressure 180/77, pulse 77, temperature 36.8 °C (98.2 °F), resp. rate 16.    Relevant Results             Assessment/Plan   Active Problems:  There are no active Hospital Problems.  78 years old here today for radiofrequency ablation of the left trigeminal nerve        Ann Braga MD

## 2023-10-17 NOTE — OP NOTE
Surgical Indications  Patient is here today to receive a left sided trigeminal nerve RF to assist with [variable]-sided trigeminal neuralgia.    Operative Report  Patient was taken to the procedure room, was placed into the supine positioning. The [variable] facial area was prepped and draped sterilely in the normal fashion. The skin and subcutaneous tissue was anesthetized with 1% lidocaine at the site of the mandibular notch. A 22-gauge spinal needle was introduced under fluoroscopic guidance, through the mandibular notch until it was in approximation to the trigeminal ganglia.  Positive sensory stimulation was achieved at 0.5 V motor stimulation was negative negative aspiration of heme and CSF, and air was demonstrated. Then the patient received 3 mL of 0.5% bupivacaine and radiofrequency ablation of temperature of 80 °C for 90 seconds was achieved the patient tolerated the procedure well, was recovered first a sufficient amount of time and then was discharged home in stable condition. The patient was advised to [facility] to assess their improvement on an as-needed basis.    Thank you for allowing me to participate in the care of this patient.

## 2023-10-18 ENCOUNTER — TELEPHONE (OUTPATIENT)
Dept: NEUROLOGY | Facility: CLINIC | Age: 78
End: 2023-10-18
Payer: MEDICARE

## 2023-10-18 NOTE — TELEPHONE ENCOUNTER
Returned call.  She had the RFA yesterday  So far is doing  very well  She takes carbamazepine 100mg/200mg  Will taper off of carbamazepine - 100mg/100mg x1 week then 100mg at bedtime x1 week then change to 100mg daily PRN

## 2023-10-18 NOTE — TELEPHONE ENCOUNTER
Patient left a voicemail stating that she had a procedure done yesterday for her trigeminal neuralgia and is wanting to know if she still needs to take her Tegretol as directed... if she does need to take it, can she use it prn? She would like to stop it. Please advise.

## 2023-10-22 PROBLEM — R30.0 DYSURIA: Status: RESOLVED | Noted: 2023-08-29 | Resolved: 2023-10-22

## 2023-10-22 PROBLEM — Z71.89 CARDIAC RISK COUNSELING: Status: ACTIVE | Noted: 2023-10-22

## 2023-10-22 PROBLEM — Z71.89 ADVANCED DIRECTIVES, COUNSELING/DISCUSSION: Status: ACTIVE | Noted: 2023-10-22

## 2023-10-22 PROBLEM — M85.80 OSTEOPENIA: Status: RESOLVED | Noted: 2023-08-29 | Resolved: 2023-10-22

## 2023-10-22 PROBLEM — H61.23 BILATERAL IMPACTED CERUMEN: Status: RESOLVED | Noted: 2023-08-29 | Resolved: 2023-10-22

## 2023-10-22 PROBLEM — R79.89 ELEVATED TSH: Status: RESOLVED | Noted: 2023-08-29 | Resolved: 2023-10-22

## 2023-10-22 PROBLEM — Z13.89 SCREENING FOR MULTIPLE CONDITIONS: Status: ACTIVE | Noted: 2023-10-22

## 2023-10-22 PROBLEM — U07.1 COVID-19: Status: RESOLVED | Noted: 2023-03-20 | Resolved: 2023-10-22

## 2023-10-24 ENCOUNTER — TELEPHONE (OUTPATIENT)
Dept: NEUROLOGY | Facility: CLINIC | Age: 78
End: 2023-10-24
Payer: MEDICARE

## 2023-10-24 NOTE — TELEPHONE ENCOUNTER
Just an fyi-patient called in wanting you to know that she is not able to wean off of her Tegretol just yet due to the pain she is experiencing after her procedure. She is currently taking 1 in the AM and 1 in the PM. She states we do not need to call her regarding this, she just wanted you to know.

## 2023-11-06 ENCOUNTER — TELEPHONE (OUTPATIENT)
Dept: PRIMARY CARE | Facility: CLINIC | Age: 78
End: 2023-11-06
Payer: MEDICARE

## 2023-11-07 ENCOUNTER — OFFICE VISIT (OUTPATIENT)
Dept: PRIMARY CARE | Facility: CLINIC | Age: 78
End: 2023-11-07
Payer: MEDICARE

## 2023-11-07 VITALS
SYSTOLIC BLOOD PRESSURE: 127 MMHG | WEIGHT: 110 LBS | HEIGHT: 63 IN | BODY MASS INDEX: 19.49 KG/M2 | OXYGEN SATURATION: 98 % | DIASTOLIC BLOOD PRESSURE: 68 MMHG | HEART RATE: 80 BPM

## 2023-11-07 DIAGNOSIS — E55.9 VITAMIN D DEFICIENCY: ICD-10-CM

## 2023-11-07 DIAGNOSIS — Z12.31 ENCOUNTER FOR SCREENING MAMMOGRAM FOR BREAST CANCER: ICD-10-CM

## 2023-11-07 DIAGNOSIS — J31.0 CHRONIC RHINITIS: ICD-10-CM

## 2023-11-07 DIAGNOSIS — Z00.00 ROUTINE ADULT HEALTH MAINTENANCE: Primary | ICD-10-CM

## 2023-11-07 DIAGNOSIS — N39.0 RECURRENT UTI: ICD-10-CM

## 2023-11-07 DIAGNOSIS — M85.9 DISORDER OF BONE DENSITY AND STRUCTURE, UNSPECIFIED: ICD-10-CM

## 2023-11-07 DIAGNOSIS — Z71.89 ADVANCED DIRECTIVES, COUNSELING/DISCUSSION: ICD-10-CM

## 2023-11-07 DIAGNOSIS — Z13.89 SCREENING FOR MULTIPLE CONDITIONS: ICD-10-CM

## 2023-11-07 DIAGNOSIS — E78.2 HYPERLIPIDEMIA, MIXED: ICD-10-CM

## 2023-11-07 DIAGNOSIS — Z71.89 CARDIAC RISK COUNSELING: ICD-10-CM

## 2023-11-07 DIAGNOSIS — Z80.0 FAMILY HISTORY OF PANCREATIC CANCER: ICD-10-CM

## 2023-11-07 DIAGNOSIS — G50.0 TRIGEMINUS NEURALGIA: ICD-10-CM

## 2023-11-07 PROBLEM — R39.9 UTI SYMPTOMS: Status: RESOLVED | Noted: 2023-03-20 | Resolved: 2023-11-07

## 2023-11-07 PROBLEM — B02.9 SHINGLES: Status: RESOLVED | Noted: 2023-08-29 | Resolved: 2023-11-07

## 2023-11-07 PROCEDURE — 1126F AMNT PAIN NOTED NONE PRSNT: CPT | Performed by: INTERNAL MEDICINE

## 2023-11-07 PROCEDURE — G0444 DEPRESSION SCREEN ANNUAL: HCPCS | Performed by: INTERNAL MEDICINE

## 2023-11-07 PROCEDURE — 99497 ADVNCD CARE PLAN 30 MIN: CPT | Performed by: INTERNAL MEDICINE

## 2023-11-07 PROCEDURE — G0439 PPPS, SUBSEQ VISIT: HCPCS | Performed by: INTERNAL MEDICINE

## 2023-11-07 PROCEDURE — G0446 INTENS BEHAVE THER CARDIO DX: HCPCS | Performed by: INTERNAL MEDICINE

## 2023-11-07 PROCEDURE — 1160F RVW MEDS BY RX/DR IN RCRD: CPT | Performed by: INTERNAL MEDICINE

## 2023-11-07 PROCEDURE — 1036F TOBACCO NON-USER: CPT | Performed by: INTERNAL MEDICINE

## 2023-11-07 PROCEDURE — 99214 OFFICE O/P EST MOD 30 MIN: CPT | Performed by: INTERNAL MEDICINE

## 2023-11-07 PROCEDURE — G0009 ADMIN PNEUMOCOCCAL VACCINE: HCPCS | Performed by: INTERNAL MEDICINE

## 2023-11-07 PROCEDURE — 1159F MED LIST DOCD IN RCRD: CPT | Performed by: INTERNAL MEDICINE

## 2023-11-07 PROCEDURE — 90677 PCV20 VACCINE IM: CPT | Performed by: INTERNAL MEDICINE

## 2023-11-07 RX ORDER — AZELASTINE 1 MG/ML
1 SPRAY, METERED NASAL 2 TIMES DAILY
Qty: 30 ML | Refills: 3 | Status: SHIPPED | OUTPATIENT
Start: 2023-11-07 | End: 2024-04-30 | Stop reason: WASHOUT

## 2023-11-07 ASSESSMENT — PATIENT HEALTH QUESTIONNAIRE - PHQ9
SUM OF ALL RESPONSES TO PHQ9 QUESTIONS 1 AND 2: 0
2. FEELING DOWN, DEPRESSED OR HOPELESS: NOT AT ALL
1. LITTLE INTEREST OR PLEASURE IN DOING THINGS: NOT AT ALL

## 2023-11-07 NOTE — PROGRESS NOTES
Medicare Wellness Exam/Comprehensive Problem Focused Follow Up and Physical Exam  Chief Complaint   Patient presents with    Medicare Annual Wellness Visit Subsequent     HPI: s/p RFA left sided trigeminal nerve RF to assist with [variable]-sided trigeminal neuralgia.  Tongue is numb now  Still has some pain  On tegretol 100mg twice daily    Has a constant runny nose  Worse with bending over     Saw Urology July (Copied):  1. Recurrent UTI   1a. prophylactic nitrofurantone after sexual intercourse, prescribed by Dr. Concepción Osborne  2. Vaginal mucosal atrophy   2a. estrace used  3. s/p sling placement (MUS) 11/13/13  4. Disorder of bone density and structure  5. HTN  6. Diverticulosis   7. SHx includes gallbladder surgery, hemicolectomy (lap right hemicolectomy bc polyp was in appendix, LNs negative), hernia repair, laproscopy partial colectomy     Today's Visit:  Last seen by myself on 7/14/22. 77 y/o female presents via TeleHealth for routine follow-up discussion via video call. She is doing well but concerned about UTI. She is taking estrogen cream vaginally, refill not needed right now. She is not currently taking any Abx besides prophylactic nitrofurantone after sexual intercourse, prescribed by Dr. Concepción Osborne. Patient reports no Sx associated with sling. Patient is allergic to or has had adverse reaction to penicillins (anaphylaxis), sulfa drugs (suspect: hives), Cipro, and clindamycin. Patient is a former smoker.      Plan:  I will order urine culture that the patient plans to take at a  location. I discussed with the patient that she does not need to fast before this test.     She should continue on estrogen cream and postcoital Abx.    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  She rates her pain as 0-2/10 and states that it never last long and occurs after more vigorous activity.     PHYSICAL  EXAMINATION: Examination of the left wrist shows full motion.  She has a prominence of the distal ulna but no tenderness at the DRUJ.  EIP, EDC, EDQ, and APL tendon function are all intact.  There is no extensor tenosynovitis.  She has mild pisotriquetral tenderness.  She has a moderate adduction contracture at the base of the thumb with 70 degrees of MP hyperextension but no basal joint tenderness.     PLAN: Given the minimal symptoms I did not recommend specific treatment.  She has a gel splint which she wears for comfort and I said that a new one could be obtained for her if she needed it.  I will see her back as needed.        Assessment and Plan:  Problem List Items Addressed This Visit          High    Routine adult health maintenance - Primary    Overview         COVID-19 Pfizer 2/26/21, 3/16/2021, 10/10/21      Influenza Seasonal - High Dose - Age 65+10/6/2016, 11/12/2015, 9/13/19, 10/18/23                 Pneumococcal-13 Vac Conjugate9/2/2016      Pneumovax 9/11/17      Cscope 10/18 (5yrs), 10/5/23 (no further scopes needed)      BMD 4/17; 10/19; 10/21; 10/2/23      Mamm 3/28/19; 6/10/20; 6/11/21; 6/13/22, 6/15/23      PAP/HPV 9/25/20 (-)      10/27/22: Current 10-Year ASCVD Risk: 22.68% - High Risk           Current Assessment & Plan     Annual Wellness exam completed   Preventive Health History reviewed  Ordered:   Labs    Mammogram   Screening MRI Poancreas (brother with pancreatic CA)  PCV 20            Medium    Advanced directives, counseling/discussion    Overview     11/7/23:: Pia Conway (daughter) is HCPOA  Doesnt want to be kept alive on machines if terminal  DNR Cardiac arrest if has a terminal condition         Cardiac risk counseling    Overview     11/7/23:  Current 10-Year ASCVD Risk: 23.% - High Risk   no ASA rec'd per recent guidelines (risk> benefit)         Current Assessment & Plan     Consider repeat CT Calcium score test in 2024 (last was 2019)         Disorder of bone density and  structure, unspecified    Overview     BMD 10/2/23: Lowest T score -1.8.   10-year Fracture Risk:   Major Osteoporotic Fracture 19.6%  Hip Fracture 11. 6%  S/p Reclast         Current Assessment & Plan     Need to find out when last dose reclast was   Plan was for 2 doses then drug holiday         Relevant Orders    TSH with reflex to Free T4 if abnormal    Comprehensive Metabolic Panel    CBC and Auto Differential    Encounter for screening mammogram for breast cancer    Relevant Orders    BI mammo bilateral screening tomosynthesis    Hyperlipidemia, mixed    Overview     Managed with diet/exercise;         Relevant Orders    TSH with reflex to Free T4 if abnormal    Lipid Panel    Recurrent UTI    Overview     On Topical ET  Saw Urology : - continue estrace and continue macrobid ppx after intercourse and diarrhea          Relevant Orders    Urinalysis with Reflex Microscopic    Screening for multiple conditions    Overview     Depression screening completed using the PHQ-2 questions with results documented in the chart/encounter  (See Rooming Screenings for documentation)           Trigeminus neuralgia    Overview     S/p left sided trigeminal nerve RF to assist with [variable]-sided trigeminal neuralgia  On Tegretol            Current Assessment & Plan     Up titrate to pain relief         Vitamin D deficiency    Relevant Orders    Vitamin D 25-Hydroxy,Total (for eval of Vitamin D levels)     Other Visit Diagnoses       Family history of pancreatic cancer        Relevant Orders    MR pancreas screening self pay    Chronic rhinitis        Cannot tolerate Flonase  will try astelin  if ineffective will try Atrovent    Relevant Medications    azelastine (Astelin) 137 mcg (0.1 %) nasal spray            Active Problem List  Patient Active Problem List   Diagnosis    Cataract, acquired    Disorder of bone density and structure, unspecified    Diverticulosis of colon    Dry eye syndrome of both eyes    Grade I  hemorrhoids    H/O supraventricular tachycardia    History of colon polyps    History of shingles    Lumbar spondylosis    Macrocytosis    MVP (mitral valve prolapse)    Postmenopausal atrophic vaginitis    Recurrent UTI    Stress incontinence in female    Trigeminus neuralgia    Vitamin D deficiency    Routine adult health maintenance    Hyperlipidemia, mixed    Prophylactic antibiotic    Advanced directives, counseling/discussion    Cardiac risk counseling    Screening for multiple conditions    Encounter for screening mammogram for breast cancer       Comprehensive Medical/Surgical/Social/Family History  Past Medical History:   Diagnosis Date    H/O bone density study 10/02/2023    Lowest T score -1.8.    10-year Fracture Risk: Major Osteoporotic Fracture  19.6 Hip Fracture                        11.6    H/O bone density study     10/2021: -1.9. FRAX      FRAX* 10-year Probability of Fracture     Based on femoral neck BMD: DualFemur (Left)     Major Osteoporotic Fracture: 18.4%     Hip Fracture:        10.1%     3/15: Osteopenia. Major osteoporotic fracture 17%. Hip fracture 3.0%     4/17: -1.9. 10-year absolute fracture risk:            - major osteoporotic fracture = 14.9 %            - hip fracture = 3.7 %4/17:    H/O bone density study     10/19: Ten Year Major Osteoporotic Fracture Risk: 10.43%      Ten Year Hip Fracture Risk: 2.3%      T-Score: -1.9    H/O CT scan     10/19: CT calcium score =0    H/O CT scan of abdomen     2/15: Rectus abdominis diastasis. No ventral abdominal hernia. Partially calcified     splenic artery aneurysm measuring 0.9 cm is stable since May 2006.     4/18: Colonic diverticulosis.    H/O echocardiogram     11/20: Normal EF%, trace MR/TR     2014: MVP with Mild [1+] eccentric MR, trace OH/TR     10/18: There is mild (1+ - 2+) mitral valve regurgitation due to prolapse likely related      to myxomatous degenerative disease. There is a posteriorly directed regurgitant      jet.  There is moderate thickening of the anterior mitral leaflet. There is prolapse of the     anterior mitral leaflet.    H/O x-ray of lumbar spine     2018 : Moderate multilevel degenerative disc narrowing with slight progression     compared to prior at the L2 -L5 levels. Facet arthrosis is most     prominent at L5-s1 bilaterally, L4-L5 on the left, and L3-L4 on the right     Past Surgical History:   Procedure Laterality Date    BLADDER SURGERY  2014    Bladder Surgery    CATARACT EXTRACTION  08/15/2018    Cataract Surgery    COLONOSCOPY      : diverticula were found in the sigmoid colon. External hemorrhoids.     10/18: Patent functional end-to-end ileo-colonic anastomosis,               characterized by healthy appearing mucosa.               - Moderate diverticulosis in the sigmoid colon, in the               descending colon and in the transverse colon. There was    evidence of an impacted diverticulum. External hemorrhoids    COLONOSCOPY  10/05/2023    · Extensive diverticulosis of severe severity causing severe luminal narrowing in the transverse colon, descending colon and sigmoid colon · Healthy ileocolonic anastomosis in the hepatic flexure · Small hemorrhoids    GALLBLADDER SURGERY  10/04/2013    Gallbladder Surgery    HEMICOLECTOMY  08/15/2018    lap right hemicolectomy bc polyp was in appendix, LNs negative    HERNIA REPAIR  08/15/2018    Hernia Repair    OTHER SURGICAL HISTORY  2014    Mid-Urethral Sling Operation    OTHER SURGICAL HISTORY  2019    Blepharoplasty    OTHER SURGICAL HISTORY  2014    Laparoscopy Partial Colectomy     Social History     Social History Narrative    Family History     · M: Pacemaker ( age 92); hip fracture s/p fall down stairs         F:  MVA young age        B: Pancreatic CA, cirrhosis, ETOH ( 70)        S: 1/2 sister. Colon CA ()        no biologic living siblings         Social History       · Former smoker (V15.82) (Z87.417)      · Quit in 1971        smoked 2-3 years     · No history of alcohol use (Z78.9)     ·  (April 2015), lives with BF        Retired, Director of [employer]        3 kids          Tobacco/Alcohol/Opioid use, as well as Illicit Drug Use was screened for/reviewed and documented in Social Documentation section of the chart and medication list as appropriate    Allergies and Medications  Penicillin g, Penicillins, Cipro [ciprofloxacin hcl], Clindamycin, and Sulfa (sulfonamide antibiotics)  Current Outpatient Medications   Medication Instructions    ascorbic acid (VITAMIN C) 1,000 mg, oral, Daily    azelastine (Astelin) 137 mcg (0.1 %) nasal spray 1 spray, Each Nostril, 2 times daily, Use in each nostril as directed    carBAMazepine (TEGretol) 100 mg chewable tablet Take 1 tablet in the morning and take 2 tablets HS    cholecalciferol (VITAMIN D-3) 50 mcg, oral, Every other day    coenzyme Q-10 200 mg, oral, TAKE AS DIRECTED.    cranberry fruit concentrate 500 mg capsule oral    cyanocobalamin (VITAMIN B-12) 1,000 mcg, oral, Daily    estradiol (Estrace) 0.01 % (0.1 mg/gram) vaginal cream vaginal, APPLY A PEA-SIZED AMOUNT TO VAGINAL OPENING EVERY MONDAY, WEDNESDAY, AND FRIDAY EVENING.    flaxseed oiL 1,000 mg, oral, TAKE AS DIRECTED.    glutathione, bulk, 100 % powder oral, CAPSULE. USE AS DIRECTED.    hydrocortisone (PROCTOSOL HC TOP) 1 Application, topical (top), 4 times daily PRN    L. acidophilus/Bifid. animalis 32 billion cell capsule oral    metoprolol succinate XL (TOPROL-XL) 50 mg, oral, Daily    multivitamin capsule 1 capsule, oral, Daily RT    NON FORMULARY Marine lipid concentrate 240-360-5 MG-MG-Unit caps    NON FORMULARY Cylospine in Klarity 0.1-25% compound BID    psyllium (Metamucil) 3.4 gram packet oral    zinc gluconate-zinc picolinate 30 mg, oral, Daily    zoledronic acid (Reclast) 5 mg/100 mL piggyback intravenous     Medications and Supplements  prescribed by me and other practitioners  or clinical pharmacist (such as prescriptions, OTC's, herbal therapies and supplements) were reviewed and documented in the medical record.      Activities of Daily Living  In your present state of health, do you have any difficulty performing the following activities?:   Preparing food and eating?: No  Bathing yourself: No  Getting dressed: No  Using the toilet:No  Moving around from place to place: No  In the past year have you fallen or had a near fall?:No  Able to manage finances independently: Yes  Able to perform grocery shopping: Yes  Able to manage medications independently: Yes  Able to do housework independently: Yes  Patient self-assessment of health status? Good    Patient Care Team:  Jessa Osborne MD as PCP - General (Internal Medicine)  Jessa Osborne MD as PCP - [facility]  Alfredo Reddy MD as Surgeon (Urology)  Lew Kim MD as Consulting Physician (Dermatology)  Ann Braga MD as Anesthesiologist (Pain Medicine)       Current exercise habits: yes   Dietary issues discussed: Yes  Hearing difficulties: No  Safe in current home environment: Yes  Visual Acuity assessed: No  Cognitive Impairment No    Depression Screening  Depression screening (15m) completed using the PHQ-2 questions with results documented in the chart/encounter  (Rooming Screening section for documentation, or note for additional information)    Cardiac Risk Assessment  Cardiovascular risk was discussed and, if needed, lifestyle modifications recommended, including nutritional choices, exercise, and elimination of habits contributing to risk.   We agreed on a plan to reduce the current cardiovascular risk based on above discussion as needed.     Aspirin use/disuse was discussed and documented in the Problem List of the medical record (under Cardiac Risk Counseling) after reviewing the updated guidelines below:  Consider low dose Aspirin ( mg) use if the benefit for cardiovascular disease prevention  "outweighs risk for bleeding complications.   In general, low dose ASA should be considered:  In patients WITHOUT prior MI/stroke/PAD (primary prevention):   a. Age <60: Use if 10-year cardiovascular disease risk >20%, with discussion of risks and benefits with patient  b. Age 60-<70: Use if 10-year cardiovascular disease risk >20% and low bleeding (e.g., gastrointestinal) risk, with discussion of risks and benefits with patient  c. Age >=70: Do not use    In patients WITH prior MI/stroke/PAD (secondary prevention):   Generally use unless extremely high bleeding (e.g., gastrointenstinal) risk, with discussion of risks and benefits with patient    Advance Directives Discussion  Advanced Care Planning (including a Living Will, Healthcare POA, as well as specific end of life choices and/or directives), was discussed with the patient and/or surrogate, voluntarily, and details of that discussion documented in the Problem List (under Advanced Directives Discussion) of the medical record.    (~16 min spent discussing above)     ROS otherwise negative aside from what was mentioned above in HPI.    Vitals  /68   Pulse 80   Ht 1.6 m (5' 3\")   Wt 49.9 kg (110 lb)   SpO2 98%   BMI 19.49 kg/m²   Body mass index is 19.49 kg/m².  Physical Exam  Gen: Alert, NAD  HEENT:  Unremarkable, tongue looks normal  Neck:  No ALFONSO  Respiratory:  Lungs CTAB  Cardiovascular:  Heart RRR  Neuro:  Gross motor and sensory intact  Skin:  No suspicious lesions present  Breast: No masses, or axillary lymphadenopathy  Gyn: Normal pelvic exam: no uterine masses or cervical lesions, or CMT; no vaginal D/C. No ovarian or adnexal masses; No external vaginal or anal/perineal lesions (Pt declined chaperone)      During the course of the visit the patient was educated and counseled about age appropriate screening and preventive services. Completed preventive screenings were documented in the chart and orders were placed for outstanding " screenings/procedures as documented in the Assessment and Plan.    Patient Instructions (the written plan) was given to the patient at check out.

## 2023-11-07 NOTE — ASSESSMENT & PLAN NOTE
Annual Wellness exam completed   Preventive Health History reviewed  Ordered:   Labs    Mammogram   Screening MRI Poancreas (brother with pancreatic CA)  PCV 20

## 2023-11-10 ENCOUNTER — TELEPHONE (OUTPATIENT)
Dept: PAIN MEDICINE | Facility: CLINIC | Age: 78
End: 2023-11-10
Payer: MEDICARE

## 2023-11-10 NOTE — TELEPHONE ENCOUNTER
Patient reports 80% relief from  trigeminal rfa for her ear and jaw pain.  Is haavinng numbness of the tongue.  Dr Braga aware of c/o numbness and advises fuv.  I called pateint and left a message for her to return our call to schedule fuv.

## 2023-11-13 ENCOUNTER — LAB (OUTPATIENT)
Dept: LAB | Facility: LAB | Age: 78
End: 2023-11-13
Payer: MEDICARE

## 2023-11-13 DIAGNOSIS — N39.0 RECURRENT UTI: ICD-10-CM

## 2023-11-13 DIAGNOSIS — M85.9 DISORDER OF BONE DENSITY AND STRUCTURE, UNSPECIFIED: ICD-10-CM

## 2023-11-13 DIAGNOSIS — E55.9 VITAMIN D DEFICIENCY: ICD-10-CM

## 2023-11-13 DIAGNOSIS — E78.2 HYPERLIPIDEMIA, MIXED: ICD-10-CM

## 2023-11-13 LAB
25(OH)D3 SERPL-MCNC: 68 NG/ML (ref 30–100)
ALBUMIN SERPL BCP-MCNC: 4.1 G/DL (ref 3.4–5)
ALP SERPL-CCNC: 57 U/L (ref 33–136)
ALT SERPL W P-5'-P-CCNC: 12 U/L (ref 7–45)
ANION GAP SERPL CALC-SCNC: 9 MMOL/L (ref 10–20)
APPEARANCE UR: CLEAR
AST SERPL W P-5'-P-CCNC: 19 U/L (ref 9–39)
BASOPHILS # BLD AUTO: 0.04 X10*3/UL (ref 0–0.1)
BASOPHILS NFR BLD AUTO: 0.9 %
BILIRUB SERPL-MCNC: 0.4 MG/DL (ref 0–1.2)
BILIRUB UR STRIP.AUTO-MCNC: NEGATIVE MG/DL
BUN SERPL-MCNC: 10 MG/DL (ref 6–23)
CALCIUM SERPL-MCNC: 9.5 MG/DL (ref 8.6–10.3)
CHLORIDE SERPL-SCNC: 101 MMOL/L (ref 98–107)
CHOLEST SERPL-MCNC: 263 MG/DL (ref 0–199)
CHOLESTEROL/HDL RATIO: 3
CO2 SERPL-SCNC: 29 MMOL/L (ref 21–32)
COLOR UR: YELLOW
CREAT SERPL-MCNC: 0.7 MG/DL (ref 0.5–1.05)
EOSINOPHIL # BLD AUTO: 0.09 X10*3/UL (ref 0–0.4)
EOSINOPHIL NFR BLD AUTO: 2 %
ERYTHROCYTE [DISTWIDTH] IN BLOOD BY AUTOMATED COUNT: 12 % (ref 11.5–14.5)
GFR SERPL CREATININE-BSD FRML MDRD: 90 ML/MIN/1.73M*2
GLUCOSE SERPL-MCNC: 80 MG/DL (ref 74–99)
GLUCOSE UR STRIP.AUTO-MCNC: NEGATIVE MG/DL
HCT VFR BLD AUTO: 37.3 % (ref 36–46)
HDLC SERPL-MCNC: 86.6 MG/DL
HGB BLD-MCNC: 12.4 G/DL (ref 12–16)
IMM GRANULOCYTES # BLD AUTO: 0.01 X10*3/UL (ref 0–0.5)
IMM GRANULOCYTES NFR BLD AUTO: 0.2 % (ref 0–0.9)
KETONES UR STRIP.AUTO-MCNC: NEGATIVE MG/DL
LDLC SERPL CALC-MCNC: 157 MG/DL
LEUKOCYTE ESTERASE UR QL STRIP.AUTO: NEGATIVE
LYMPHOCYTES # BLD AUTO: 0.99 X10*3/UL (ref 0.8–3)
LYMPHOCYTES NFR BLD AUTO: 21.6 %
MCH RBC QN AUTO: 33.2 PG (ref 26–34)
MCHC RBC AUTO-ENTMCNC: 33.2 G/DL (ref 32–36)
MCV RBC AUTO: 100 FL (ref 80–100)
MONOCYTES # BLD AUTO: 0.36 X10*3/UL (ref 0.05–0.8)
MONOCYTES NFR BLD AUTO: 7.9 %
NEUTROPHILS # BLD AUTO: 3.09 X10*3/UL (ref 1.6–5.5)
NEUTROPHILS NFR BLD AUTO: 67.4 %
NITRITE UR QL STRIP.AUTO: NEGATIVE
NON HDL CHOLESTEROL: 176 MG/DL (ref 0–149)
NRBC BLD-RTO: 0 /100 WBCS (ref 0–0)
PH UR STRIP.AUTO: 6 [PH]
PLATELET # BLD AUTO: 274 X10*3/UL (ref 150–450)
POTASSIUM SERPL-SCNC: 4.1 MMOL/L (ref 3.5–5.3)
PROT SERPL-MCNC: 6.4 G/DL (ref 6.4–8.2)
PROT UR STRIP.AUTO-MCNC: NEGATIVE MG/DL
RBC # BLD AUTO: 3.74 X10*6/UL (ref 4–5.2)
RBC # UR STRIP.AUTO: NEGATIVE /UL
SODIUM SERPL-SCNC: 135 MMOL/L (ref 136–145)
SP GR UR STRIP.AUTO: 1.01
TRIGL SERPL-MCNC: 99 MG/DL (ref 0–149)
TSH SERPL-ACNC: 2.13 MIU/L (ref 0.44–3.98)
UROBILINOGEN UR STRIP.AUTO-MCNC: <2 MG/DL
VLDL: 20 MG/DL (ref 0–40)
WBC # BLD AUTO: 4.6 X10*3/UL (ref 4.4–11.3)

## 2023-11-13 PROCEDURE — 80061 LIPID PANEL: CPT

## 2023-11-13 PROCEDURE — 85025 COMPLETE CBC W/AUTO DIFF WBC: CPT

## 2023-11-13 PROCEDURE — 84443 ASSAY THYROID STIM HORMONE: CPT

## 2023-11-13 PROCEDURE — 81003 URINALYSIS AUTO W/O SCOPE: CPT

## 2023-11-13 PROCEDURE — 36415 COLL VENOUS BLD VENIPUNCTURE: CPT

## 2023-11-13 PROCEDURE — 80053 COMPREHEN METABOLIC PANEL: CPT

## 2023-11-13 PROCEDURE — 82306 VITAMIN D 25 HYDROXY: CPT

## 2023-11-14 ENCOUNTER — TELEPHONE (OUTPATIENT)
Dept: PRIMARY CARE | Facility: CLINIC | Age: 78
End: 2023-11-14
Payer: MEDICARE

## 2023-11-14 RX ORDER — DIAZEPAM 2 MG/1
TABLET ORAL
COMMUNITY
Start: 2023-04-03 | End: 2024-01-24 | Stop reason: WASHOUT

## 2023-11-14 NOTE — TELEPHONE ENCOUNTER
Patient calling   Wants to know if okay to take valium for upcoming mri   Has 1 tablet at home   Just wants okay to take prior to MRI

## 2023-11-20 ENCOUNTER — OFFICE VISIT (OUTPATIENT)
Dept: PAIN MEDICINE | Facility: CLINIC | Age: 78
End: 2023-11-20
Payer: MEDICARE

## 2023-11-20 ENCOUNTER — ANCILLARY PROCEDURE (OUTPATIENT)
Dept: RADIOLOGY | Facility: CLINIC | Age: 78
End: 2023-11-20
Payer: MEDICARE

## 2023-11-20 DIAGNOSIS — Z80.0 FAMILY HISTORY OF PANCREATIC CANCER: ICD-10-CM

## 2023-11-20 DIAGNOSIS — G50.0 TRIGEMINUS NEURALGIA: Primary | ICD-10-CM

## 2023-11-20 PROCEDURE — 99214 OFFICE O/P EST MOD 30 MIN: CPT | Performed by: PAIN MEDICINE

## 2023-11-20 PROCEDURE — 99214 OFFICE O/P EST MOD 30 MIN: CPT | Mod: 25 | Performed by: PAIN MEDICINE

## 2023-11-20 PROCEDURE — 6100000002 MR PANCREAS SCREENING SELF PAY

## 2023-11-20 PROCEDURE — 1159F MED LIST DOCD IN RCRD: CPT | Performed by: PAIN MEDICINE

## 2023-11-20 PROCEDURE — 1126F AMNT PAIN NOTED NONE PRSNT: CPT | Performed by: PAIN MEDICINE

## 2023-11-20 PROCEDURE — 74181 MRI ABDOMEN W/O CONTRAST: CPT | Performed by: RADIOLOGY

## 2023-11-20 PROCEDURE — 1160F RVW MEDS BY RX/DR IN RCRD: CPT | Performed by: PAIN MEDICINE

## 2023-11-20 PROCEDURE — 1036F TOBACCO NON-USER: CPT | Performed by: PAIN MEDICINE

## 2023-11-20 ASSESSMENT — COLUMBIA-SUICIDE SEVERITY RATING SCALE - C-SSRS
1. IN THE PAST MONTH, HAVE YOU WISHED YOU WERE DEAD OR WISHED YOU COULD GO TO SLEEP AND NOT WAKE UP?: NO
2. HAVE YOU ACTUALLY HAD ANY THOUGHTS OF KILLING YOURSELF?: NO
6. HAVE YOU EVER DONE ANYTHING, STARTED TO DO ANYTHING, OR PREPARED TO DO ANYTHING TO END YOUR LIFE?: NO

## 2023-11-20 ASSESSMENT — PATIENT HEALTH QUESTIONNAIRE - PHQ9
1. LITTLE INTEREST OR PLEASURE IN DOING THINGS: NOT AT ALL
SUM OF ALL RESPONSES TO PHQ9 QUESTIONS 1 AND 2: 0
2. FEELING DOWN, DEPRESSED OR HOPELESS: NOT AT ALL

## 2023-11-20 ASSESSMENT — ENCOUNTER SYMPTOMS: OCCASIONAL FEELINGS OF UNSTEADINESS: 0

## 2023-11-20 NOTE — H&P
History Of Present Illness  Joanna Kendrick is a 78 y.o. female presenting with trigeminal neuralgia     Did receive the radiofrequency ablation of the left trigeminal nerve she is acknowledging an improvement that she is rating and the 70th percentile she still taking the Tegretol regularly and she had been describing some changes in the sensation in the anterior two thirds of her tongue I did explain to her that this is part of the procedure that was performed since a branch supplying also the tongue part in general she acknowledged an improvement she is here to discuss if there is any medication that we could adjust in order to improve her condition.  Rating her pain with the usage of the Tegretol to 1-2 out of 10    Past Medical History  Past Medical History:   Diagnosis Date    H/O bone density study 10/02/2023    Lowest T score -1.8.    10-year Fracture Risk: Major Osteoporotic Fracture  19.6 Hip Fracture                        11.6    H/O bone density study     10/2021: -1.9. FRAX      FRAX* 10-year Probability of Fracture     Based on femoral neck BMD: DualFemur (Left)     Major Osteoporotic Fracture: 18.4%     Hip Fracture:        10.1%     3/15: Osteopenia. Major osteoporotic fracture 17%. Hip fracture 3.0%     4/17: -1.9. 10-year absolute fracture risk:            - major osteoporotic fracture = 14.9 %            - hip fracture = 3.7 %4/17:    H/O bone density study     10/19: Ten Year Major Osteoporotic Fracture Risk: 10.43%      Ten Year Hip Fracture Risk: 2.3%      T-Score: -1.9    H/O CT scan     10/19: CT calcium score =0    H/O CT scan of abdomen     2/15: Rectus abdominis diastasis. No ventral abdominal hernia. Partially calcified     splenic artery aneurysm measuring 0.9 cm is stable since May 2006.     4/18: Colonic diverticulosis.    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 There is a posteriorly directed regurgitant      jet. There is moderate thickening of the anterior mitral leaflet. There is prolapse of the     anterior mitral leaflet.    H/O magnetic resonance imaging 11/20/2023    MRI Pancreas:2. No definite pancreatic mass or ductal dilatation. 3. Diffuse biliary dilatation likely related to chronic cholecystectomy status considering lack of additional findings to indicate biliary obstruction. Specifically no biliary stricture, choledocholithiasis, or extrinsic compression is identified. Suggest correlation with LFTs. If clinical concern for biliary obstruction,ERCP    H/O x-ray of lumbar spine     09/2018 : Moderate multilevel degenerative disc narrowing with slight progression     compared to prior at the L2 -L5 levels. Facet arthrosis is most     prominent at L5-s1 bilaterally, L4-L5 on the left, and L3-L4 on the right       Surgical History  Past Surgical History:   Procedure Laterality Date    BLADDER SURGERY  05/30/2014    Bladder Surgery    CATARACT EXTRACTION  08/15/2018    Cataract Surgery    COLONOSCOPY      2015: diverticula were found in the sigmoid colon. External hemorrhoids.     10/18: Patent functional end-to-end ileo-colonic anastomosis,               characterized by healthy appearing mucosa.               - Moderate diverticulosis in the sigmoid colon, in the               descending colon and in the transverse colon. There was    evidence of an impacted diverticulum. External hemorrhoids    COLONOSCOPY  10/05/2023    · Extensive diverticulosis of severe severity causing severe luminal narrowing in the transverse colon, descending colon and sigmoid colon · Healthy ileocolonic anastomosis in the hepatic flexure · Small hemorrhoids    GALLBLADDER SURGERY  10/04/2013    Gallbladder Surgery    HEMICOLECTOMY  08/15/2018    lap right hemicolectomy bc polyp was in appendix, LNs  negative    HERNIA REPAIR  08/15/2018    Hernia Repair    OTHER SURGICAL HISTORY  2014    Mid-Urethral Sling Operation    OTHER SURGICAL HISTORY  2019    Blepharoplasty    OTHER SURGICAL HISTORY  2014    Laparoscopy Partial Colectomy        Social History  She reports that she has never smoked. She has never used smokeless tobacco. She reports that she does not drink alcohol. No history on file for drug use.    Family History  Family History   Problem Relation Name Age of Onset    Other (Pacemaker) Mother           age 92    Hip fracture Mother          s/p fall down stairs    Other (MVA-  young age) Father      Alcohol abuse Brother      Pancreatic cancer Brother           70    Cirrhosis Brother      Colon cancer Half-Sister          Allergies  Penicillin g, Penicillins, Cipro [ciprofloxacin hcl], Clindamycin, and Sulfa (sulfonamide antibiotics)    Review of Systems   All 13 systems were reviewed and are within normal levels except as noted below or per HPI. Positive and pertinent negative responses are noted below or in the HPI   Denied any fever or chills. No weight loss and no night sweats. No cough or sputum production. No diarrhea   No constipation  No bladder and bowel incontinence and no other changes in bladder and bowel. No skin changes.   Denied opioids diversion and abuse and denies alcoholism. Denies overuse of  pain medications.   Physical Exam       Past medical history no interval changes has been noted    On physical examination    General   Alert, oriented x3 pleasant and cooperative. Does not look in any major distress.    HEENT  Pupils normal in size. Ears, nose, mouth, and throat appear to be in normal condition.  Head atraumatic      No signs of sedation or signs of withdrawal apparent.    Psychiatric   No signs of depression apparent.    Neuro   No focal neurological deficit apparent. Ambulation at baseline.      Respiratory  No respiratory distress      Abdomen  no distention     Skin  No skin markings supportive of recent IV drug usage .    Cardiovascular  Regular rate and rhythm    Last Recorded Vitals  There were no vitals taken for this visit.    Relevant Results         Assessment/Plan       78 years old with history and physical examination supportive of left trigeminal neuralgia  With some improvement after a left trigeminal radiofrequency ablation  Plan  Advised the patient to always be on the least needed of the Tegretol as needed by a reassured her that there is still room of improvement if needed to add just the dose of the Tegretol I also advised her that on as-needed basis we could consider adding gabapentin trial and for extreme situation we could also add Klonopin starting with a 0.5 mg the patient at this stage would like to limit the medication to the least and I advised her that she is welcome to contact me on as-needed basis to make the changes      The above clinical summary has been dictated with voice recognition software. It has not been proofread for grammatical errors, typographical mistakes, or other semantic inconsistencies.    Thank you for visiting our office today. It was our pleasure to take part in your healthcare.     Please do not hesitate to contact the pain clinic after your visit with any questions or concerns at  M-F 8-4 pm       Ann Braga M.D.  Medical Director , Division of Pain Medicine [facility]   of Anesthesiology and Pain Medicine  [facility] [employer]     [place] [address] [address] [place] [id number]     Office: [phone]  Fax: [phone]           Ann Braga MD

## 2023-11-28 ENCOUNTER — APPOINTMENT (OUTPATIENT)
Dept: PRIMARY CARE | Facility: CLINIC | Age: 78
End: 2023-11-28
Payer: MEDICARE

## 2024-01-18 ENCOUNTER — TELEPHONE (OUTPATIENT)
Dept: PRIMARY CARE | Facility: CLINIC | Age: 79
End: 2024-01-18
Payer: MEDICARE

## 2024-01-24 ENCOUNTER — OFFICE VISIT (OUTPATIENT)
Dept: NEUROLOGY | Facility: CLINIC | Age: 79
End: 2024-01-24
Payer: MEDICARE

## 2024-01-24 VITALS — WEIGHT: 114 LBS | TEMPERATURE: 97.3 F | RESPIRATION RATE: 18 BRPM | BODY MASS INDEX: 18.99 KG/M2 | HEIGHT: 65 IN

## 2024-01-24 DIAGNOSIS — G50.0 TRIGEMINUS NEURALGIA: Primary | ICD-10-CM

## 2024-01-24 PROCEDURE — 99214 OFFICE O/P EST MOD 30 MIN: CPT | Performed by: STUDENT IN AN ORGANIZED HEALTH CARE EDUCATION/TRAINING PROGRAM

## 2024-01-24 PROCEDURE — 1160F RVW MEDS BY RX/DR IN RCRD: CPT | Performed by: STUDENT IN AN ORGANIZED HEALTH CARE EDUCATION/TRAINING PROGRAM

## 2024-01-24 PROCEDURE — 1159F MED LIST DOCD IN RCRD: CPT | Performed by: STUDENT IN AN ORGANIZED HEALTH CARE EDUCATION/TRAINING PROGRAM

## 2024-01-24 PROCEDURE — 1036F TOBACCO NON-USER: CPT | Performed by: STUDENT IN AN ORGANIZED HEALTH CARE EDUCATION/TRAINING PROGRAM

## 2024-01-24 PROCEDURE — 1157F ADVNC CARE PLAN IN RCRD: CPT | Performed by: STUDENT IN AN ORGANIZED HEALTH CARE EDUCATION/TRAINING PROGRAM

## 2024-01-24 PROCEDURE — 1126F AMNT PAIN NOTED NONE PRSNT: CPT | Performed by: STUDENT IN AN ORGANIZED HEALTH CARE EDUCATION/TRAINING PROGRAM

## 2024-01-24 ASSESSMENT — PAIN SCALES - GENERAL: PAINLEVEL: 0-NO PAIN

## 2024-01-24 ASSESSMENT — ENCOUNTER SYMPTOMS
LOSS OF SENSATION IN FEET: 0
OCCASIONAL FEELINGS OF UNSTEADINESS: 0
DEPRESSION: 0

## 2024-01-24 NOTE — PROGRESS NOTES
Subjective     Joanna Kendrick is a 78 y.o. year old female for follow-up of left sided trigeminal neuralgia.     She was last seen 7/202023. Since then, she had a radiofrequency ablation and did well. She tapered off of carbamazepine but had recurrence of pain. She is now on carbamazepine 100mg TID. She has recurrent symptoms 9 or 10pm at night. This is effective for her pain control, she has not required any breakthrough carbamazepine.      4/27/2023 MRI IAC with and without contrast showed no structural lesion. Dental evaluation normal.     Patient Active Problem List   Diagnosis    Cataract, acquired    Disorder of bone density and structure, unspecified    Diverticulosis of colon    Dry eye syndrome of both eyes    Grade I hemorrhoids    H/O supraventricular tachycardia    History of colon polyps    History of shingles    Lumbar spondylosis    Macrocytosis    MVP (mitral valve prolapse)    Postmenopausal atrophic vaginitis    Recurrent UTI    Stress incontinence in female    Trigeminus neuralgia    Vitamin D deficiency    Routine adult health maintenance    Hyperlipidemia, mixed    Prophylactic antibiotic    Advanced directives, counseling/discussion    Cardiac risk counseling    Screening for multiple conditions    Encounter for screening mammogram for breast cancer       Objective   Neurological Exam  Mental Status  Awake, alert and oriented to person, place and time. Speech is normal.    Cranial Nerves  CN II: Visual fields full to confrontation.  CN III, IV, VI: Extraocular movements intact bilaterally.  CN V: Facial sensation is normal.  CN VII: Full and symmetric facial movement.  CN VIII: Hearing is normal.  CN IX, X: Palate elevates symmetrically  CN XI: Shoulder shrug strength is normal.  CN XII: Tongue midline without atrophy or fasciculations.    Motor   Strength is 5/5 throughout all four extremities.    Sensory  Light touch is normal in upper and lower extremities.     Coordination  Right:  Finger-to-nose normal.Left: Finger-to-nose normal.    Physical Exam  Eyes:      Extraocular Movements: Extraocular movements intact.   Neurological:      Motor: Motor strength is normal.  Psychiatric:         Speech: Speech normal.       Assessment/Plan   Diagnoses and all orders for this visit:  Trigeminus neuralgia    Trigeminal neuralgia: s/p RFA with improvement of her pain but without complete relief. Remains on carbamazepine 100mg TID with good pain control. Discussed long term risks, which include bone marrow suppression, and osteoporosis but this occurs with higher doses and prolonged risk. She is on calcium supplementation and exercises regularly.     Follow-up in 6 months and if stable then annually

## 2024-04-30 ENCOUNTER — OFFICE VISIT (OUTPATIENT)
Dept: PRIMARY CARE | Facility: CLINIC | Age: 79
End: 2024-04-30
Payer: MEDICARE

## 2024-04-30 VITALS
DIASTOLIC BLOOD PRESSURE: 77 MMHG | OXYGEN SATURATION: 97 % | TEMPERATURE: 97.3 F | SYSTOLIC BLOOD PRESSURE: 147 MMHG | HEART RATE: 71 BPM

## 2024-04-30 DIAGNOSIS — R35.0 URINARY FREQUENCY: Primary | ICD-10-CM

## 2024-04-30 DIAGNOSIS — G50.0 TRIGEMINUS NEURALGIA: ICD-10-CM

## 2024-04-30 DIAGNOSIS — I10 PRIMARY HYPERTENSION: ICD-10-CM

## 2024-04-30 LAB
APPEARANCE UR: CLEAR
BILIRUB UR STRIP.AUTO-MCNC: NEGATIVE MG/DL
COLOR UR: YELLOW
GLUCOSE UR STRIP.AUTO-MCNC: NEGATIVE MG/DL
KETONES UR STRIP.AUTO-MCNC: NEGATIVE MG/DL
LEUKOCYTE ESTERASE UR QL STRIP.AUTO: NEGATIVE
NITRITE UR QL STRIP.AUTO: NEGATIVE
PH UR STRIP.AUTO: 7 [PH]
PROT UR STRIP.AUTO-MCNC: NEGATIVE MG/DL
RBC # UR STRIP.AUTO: NEGATIVE /UL
SP GR UR STRIP.AUTO: 1.01
UROBILINOGEN UR STRIP.AUTO-MCNC: <2 MG/DL

## 2024-04-30 PROCEDURE — 99213 OFFICE O/P EST LOW 20 MIN: CPT | Performed by: NURSE PRACTITIONER

## 2024-04-30 PROCEDURE — 1159F MED LIST DOCD IN RCRD: CPT | Performed by: NURSE PRACTITIONER

## 2024-04-30 PROCEDURE — 3078F DIAST BP <80 MM HG: CPT | Performed by: NURSE PRACTITIONER

## 2024-04-30 PROCEDURE — 1123F ACP DISCUSS/DSCN MKR DOCD: CPT | Performed by: NURSE PRACTITIONER

## 2024-04-30 PROCEDURE — G2211 COMPLEX E/M VISIT ADD ON: HCPCS | Performed by: NURSE PRACTITIONER

## 2024-04-30 PROCEDURE — 87086 URINE CULTURE/COLONY COUNT: CPT

## 2024-04-30 PROCEDURE — 1160F RVW MEDS BY RX/DR IN RCRD: CPT | Performed by: NURSE PRACTITIONER

## 2024-04-30 PROCEDURE — 1157F ADVNC CARE PLAN IN RCRD: CPT | Performed by: NURSE PRACTITIONER

## 2024-04-30 PROCEDURE — 1036F TOBACCO NON-USER: CPT | Performed by: NURSE PRACTITIONER

## 2024-04-30 PROCEDURE — 81003 URINALYSIS AUTO W/O SCOPE: CPT

## 2024-04-30 PROCEDURE — 3077F SYST BP >= 140 MM HG: CPT | Performed by: NURSE PRACTITIONER

## 2024-04-30 PROCEDURE — 1158F ADVNC CARE PLAN TLK DOCD: CPT | Performed by: NURSE PRACTITIONER

## 2024-04-30 RX ORDER — CARBAMAZEPINE 100 MG/1
TABLET, CHEWABLE ORAL
Qty: 90 TABLET | Refills: 3 | Status: SHIPPED | OUTPATIENT
Start: 2024-04-30

## 2024-04-30 ASSESSMENT — PATIENT HEALTH QUESTIONNAIRE - PHQ9
2. FEELING DOWN, DEPRESSED OR HOPELESS: NOT AT ALL
1. LITTLE INTEREST OR PLEASURE IN DOING THINGS: NOT AT ALL
SUM OF ALL RESPONSES TO PHQ9 QUESTIONS 1 AND 2: 0

## 2024-04-30 NOTE — PATIENT INSTRUCTIONS
Take blood pressure & heart rate reading 2 or more hours after your BP medication  Choose the calmest time of day  Sit with feet uncrossed, flat on the floor, arm at heart level  If first reading is elevated, wait 3-5 minutes and repeat & record    Record at least 2 weeks worth of readings  Follow up with those readings and your cuff/machine

## 2024-04-30 NOTE — PROGRESS NOTES
Subjective   Patient ID: Joanna Kendrick is a 78 y.o. female who presents for UTI (Frequency an urgency /More than usual ).    Drinks lots of fluids  And has frequency  But now even more frequent  No unusual urgency  No burning  No dribbling  No low back pain  No low abdominal pain  No hematuria          Review of Systems  ROS completely negative except what was mentioned in the HPI.  Problem List, surgical, social, and family histories which were reviewed and updated as necessary within the EMR. I also personally reviewed the notes, labs, and imaging that pertained to what was documented or discussed in the HPI.    Objective   Physical Exam  Vitals and nursing note reviewed.   Constitutional:       General: She is not in acute distress.     Appearance: Normal appearance.   HENT:      Head: Normocephalic and atraumatic.      Right Ear: External ear normal.      Left Ear: External ear normal.      Nose: Nose normal.      Mouth/Throat:      Mouth: Mucous membranes are moist.   Eyes:      Extraocular Movements: Extraocular movements intact.      Conjunctiva/sclera: Conjunctivae normal.      Pupils: Pupils are equal, round, and reactive to light.   Cardiovascular:      Rate and Rhythm: Normal rate and regular rhythm.      Heart sounds: Normal heart sounds.   Pulmonary:      Effort: Pulmonary effort is normal.      Breath sounds: Normal breath sounds.   Abdominal:      General: Bowel sounds are normal.      Palpations: Abdomen is soft.      Tenderness: There is no abdominal tenderness.   Musculoskeletal:         General: Normal range of motion.      Cervical back: Normal range of motion and neck supple.   Skin:     General: Skin is warm and dry.   Neurological:      General: No focal deficit present.      Mental Status: She is alert and oriented to person, place, and time. Mental status is at baseline.   Psychiatric:         Mood and Affect: Mood normal.         Behavior: Behavior normal.         Thought Content: Thought  content normal.         Judgment: Judgment normal.         /77   Pulse 71   Temp 36.3 °C (97.3 °F) (Temporal)   LMP  (LMP Unknown)   SpO2 97%     Assessment/Plan    Problem List Items Addressed This Visit       Primary hypertension    Current Assessment & Plan     Needs cuff comparison and home readings  She is reluctant to take any BP medication except her BB  But she is agreeable to monitor to get a baseline          Other Visit Diagnoses       Urinary frequency    -  Primary    Will send UA and Culture.  If culture still pending by friday, will send antibiotic that she can take out of town until it is resulted    Relevant Orders    Urinalysis with Reflex Microscopic    Urine Culture

## 2024-04-30 NOTE — ASSESSMENT & PLAN NOTE
Needs cuff comparison and home readings  She is reluctant to take any BP medication except her BB  But she is agreeable to monitor to get a baseline

## 2024-05-02 DIAGNOSIS — Z79.2 NEED FOR ANTIBIOTIC PROPHYLAXIS FOR DENTAL PROCEDURE: ICD-10-CM

## 2024-05-02 LAB — BACTERIA UR CULT: NO GROWTH

## 2024-05-02 RX ORDER — ERYTHROMYCIN 250 MG/1
500 TABLET, DELAYED RELEASE ORAL
COMMUNITY
End: 2024-05-02 | Stop reason: SDUPTHER

## 2024-05-02 RX ORDER — AZITHROMYCIN 250 MG/1
TABLET, FILM COATED ORAL
Qty: 6 TABLET | Refills: 0 | Status: SHIPPED | OUTPATIENT
Start: 2024-05-02 | End: 2024-05-28 | Stop reason: ALTCHOICE

## 2024-05-22 ENCOUNTER — APPOINTMENT (OUTPATIENT)
Dept: PRIMARY CARE | Facility: CLINIC | Age: 79
End: 2024-05-22
Payer: MEDICARE

## 2024-05-28 ENCOUNTER — OFFICE VISIT (OUTPATIENT)
Dept: PRIMARY CARE | Facility: CLINIC | Age: 79
End: 2024-05-28
Payer: MEDICARE

## 2024-05-28 ENCOUNTER — APPOINTMENT (OUTPATIENT)
Dept: PRIMARY CARE | Facility: CLINIC | Age: 79
End: 2024-05-28
Payer: MEDICARE

## 2024-05-28 VITALS
SYSTOLIC BLOOD PRESSURE: 111 MMHG | OXYGEN SATURATION: 98 % | TEMPERATURE: 97.3 F | WEIGHT: 112.2 LBS | HEART RATE: 78 BPM | DIASTOLIC BLOOD PRESSURE: 68 MMHG | BODY MASS INDEX: 18.67 KG/M2

## 2024-05-28 DIAGNOSIS — Z71.89 CARDIAC RISK COUNSELING: Primary | ICD-10-CM

## 2024-05-28 DIAGNOSIS — I10 WHITE COAT SYNDROME WITH DIAGNOSIS OF HYPERTENSION: Primary | ICD-10-CM

## 2024-05-28 PROCEDURE — 1123F ACP DISCUSS/DSCN MKR DOCD: CPT | Performed by: NURSE PRACTITIONER

## 2024-05-28 PROCEDURE — 1159F MED LIST DOCD IN RCRD: CPT | Performed by: NURSE PRACTITIONER

## 2024-05-28 PROCEDURE — G2211 COMPLEX E/M VISIT ADD ON: HCPCS | Performed by: NURSE PRACTITIONER

## 2024-05-28 PROCEDURE — 99213 OFFICE O/P EST LOW 20 MIN: CPT | Performed by: NURSE PRACTITIONER

## 2024-05-28 PROCEDURE — 1036F TOBACCO NON-USER: CPT | Performed by: NURSE PRACTITIONER

## 2024-05-28 PROCEDURE — 1157F ADVNC CARE PLAN IN RCRD: CPT | Performed by: NURSE PRACTITIONER

## 2024-05-28 PROCEDURE — 1160F RVW MEDS BY RX/DR IN RCRD: CPT | Performed by: NURSE PRACTITIONER

## 2024-05-28 PROCEDURE — 3078F DIAST BP <80 MM HG: CPT | Performed by: NURSE PRACTITIONER

## 2024-05-28 PROCEDURE — 3074F SYST BP LT 130 MM HG: CPT | Performed by: NURSE PRACTITIONER

## 2024-05-28 ASSESSMENT — PATIENT HEALTH QUESTIONNAIRE - PHQ9
2. FEELING DOWN, DEPRESSED OR HOPELESS: NOT AT ALL
SUM OF ALL RESPONSES TO PHQ9 QUESTIONS 1 AND 2: 0
1. LITTLE INTEREST OR PLEASURE IN DOING THINGS: NOT AT ALL

## 2024-05-28 NOTE — ASSESSMENT & PLAN NOTE
Home readings well controlled  Highest: 126/80  Lowest; 102/77  All taken prior to BB, no symptoms of low BP post BB

## 2024-05-28 NOTE — PROGRESS NOTES
Subjective   Patient ID: Joanna Kendrick is a 78 y.o. female who presents for Follow-up (B/p follow up -[facility] arm cuff/).    Cuff Comparison  Office cuff:  111/68  78  Home cuff:  117/71  80        Review of Systems  ROS completely negative except what was mentioned in the HPI.  Problem List, surgical, social, and family histories which were reviewed and updated as necessary within the EMR. I also personally reviewed the notes, labs, and imaging that pertained to what was documented or discussed in the HPI.    Objective   Physical Exam  Vitals and nursing note reviewed.   Constitutional:       General: She is not in acute distress.     Appearance: Normal appearance.   HENT:      Head: Normocephalic and atraumatic.      Right Ear: External ear normal.      Left Ear: External ear normal.      Nose: Nose normal.      Mouth/Throat:      Mouth: Mucous membranes are moist.   Eyes:      Extraocular Movements: Extraocular movements intact.      Conjunctiva/sclera: Conjunctivae normal.      Pupils: Pupils are equal, round, and reactive to light.   Cardiovascular:      Rate and Rhythm: Normal rate and regular rhythm.      Heart sounds: Normal heart sounds.   Pulmonary:      Effort: Pulmonary effort is normal.      Breath sounds: Normal breath sounds.   Musculoskeletal:         General: Normal range of motion.      Cervical back: Normal range of motion and neck supple.   Skin:     General: Skin is warm and dry.   Neurological:      General: No focal deficit present.      Mental Status: She is alert and oriented to person, place, and time. Mental status is at baseline.   Psychiatric:         Mood and Affect: Mood normal.         Behavior: Behavior normal.         Thought Content: Thought content normal.         Judgment: Judgment normal.         /68   Pulse 78   Temp 36.3 °C (97.3 °F) (Temporal)   Wt 50.9 kg (112 lb 3.2 oz)   LMP  (LMP Unknown)   SpO2 98%   BMI 18.67 kg/m²     Assessment/Plan    Problem List  Items Addressed This Visit       White coat syndrome with diagnosis of hypertension - Primary    Overview     Goal <130/80  5/28/24 Cuff Comparison & home readings reviewed  Office cuff:  111/68  78  Home cuff:  117/71  80  On BB         Current Assessment & Plan     Home readings well controlled  Highest: 126/80  Lowest; 102/77  All taken prior to BB, no symptoms of low BP post BB

## 2024-06-03 ENCOUNTER — HOSPITAL ENCOUNTER (OUTPATIENT)
Dept: RADIOLOGY | Facility: CLINIC | Age: 79
Discharge: HOME | End: 2024-06-03
Payer: MEDICARE

## 2024-06-03 VITALS — WEIGHT: 109 LBS | HEIGHT: 65 IN | BODY MASS INDEX: 18.16 KG/M2

## 2024-06-03 DIAGNOSIS — Z12.31 ENCOUNTER FOR SCREENING MAMMOGRAM FOR BREAST CANCER: ICD-10-CM

## 2024-06-03 PROCEDURE — 77067 SCR MAMMO BI INCL CAD: CPT | Performed by: RADIOLOGY

## 2024-06-03 PROCEDURE — 77067 SCR MAMMO BI INCL CAD: CPT

## 2024-06-03 PROCEDURE — 77063 BREAST TOMOSYNTHESIS BI: CPT | Performed by: RADIOLOGY

## 2024-06-28 DIAGNOSIS — N95.2 POSTMENOPAUSAL ATROPHIC VAGINITIS: ICD-10-CM

## 2024-06-28 RX ORDER — ESTRADIOL 0.1 MG/G
CREAM VAGINAL
Qty: 42.5 G | Refills: 3 | Status: SHIPPED | OUTPATIENT
Start: 2024-06-28

## 2024-07-24 ENCOUNTER — APPOINTMENT (OUTPATIENT)
Dept: NEUROLOGY | Facility: CLINIC | Age: 79
End: 2024-07-24
Payer: MEDICARE

## 2024-07-24 VITALS
TEMPERATURE: 97.3 F | WEIGHT: 108 LBS | SYSTOLIC BLOOD PRESSURE: 130 MMHG | HEART RATE: 72 BPM | DIASTOLIC BLOOD PRESSURE: 72 MMHG | HEIGHT: 63 IN | BODY MASS INDEX: 19.14 KG/M2

## 2024-07-24 DIAGNOSIS — G50.0 TRIGEMINUS NEURALGIA: Primary | ICD-10-CM

## 2024-07-24 PROCEDURE — 3078F DIAST BP <80 MM HG: CPT | Performed by: STUDENT IN AN ORGANIZED HEALTH CARE EDUCATION/TRAINING PROGRAM

## 2024-07-24 PROCEDURE — 3075F SYST BP GE 130 - 139MM HG: CPT | Performed by: STUDENT IN AN ORGANIZED HEALTH CARE EDUCATION/TRAINING PROGRAM

## 2024-07-24 PROCEDURE — 99214 OFFICE O/P EST MOD 30 MIN: CPT | Performed by: STUDENT IN AN ORGANIZED HEALTH CARE EDUCATION/TRAINING PROGRAM

## 2024-07-24 PROCEDURE — 1157F ADVNC CARE PLAN IN RCRD: CPT | Performed by: STUDENT IN AN ORGANIZED HEALTH CARE EDUCATION/TRAINING PROGRAM

## 2024-07-24 PROCEDURE — 1123F ACP DISCUSS/DSCN MKR DOCD: CPT | Performed by: STUDENT IN AN ORGANIZED HEALTH CARE EDUCATION/TRAINING PROGRAM

## 2024-07-24 PROCEDURE — 1159F MED LIST DOCD IN RCRD: CPT | Performed by: STUDENT IN AN ORGANIZED HEALTH CARE EDUCATION/TRAINING PROGRAM

## 2024-07-24 ASSESSMENT — LIFESTYLE VARIABLES
HOW OFTEN DO YOU HAVE A DRINK CONTAINING ALCOHOL: NEVER
HOW OFTEN DO YOU HAVE SIX OR MORE DRINKS ON ONE OCCASION: NEVER
HOW MANY STANDARD DRINKS CONTAINING ALCOHOL DO YOU HAVE ON A TYPICAL DAY: PATIENT DOES NOT DRINK
AUDIT-C TOTAL SCORE: 0
SKIP TO QUESTIONS 9-10: 1

## 2024-07-24 ASSESSMENT — PATIENT HEALTH QUESTIONNAIRE - PHQ9
2. FEELING DOWN, DEPRESSED OR HOPELESS: NOT AT ALL
1. LITTLE INTEREST OR PLEASURE IN DOING THINGS: NOT AT ALL
SUM OF ALL RESPONSES TO PHQ9 QUESTIONS 1 & 2: 0

## 2024-07-24 ASSESSMENT — ENCOUNTER SYMPTOMS
SPEECH DIFFICULTY: 0
HEADACHES: 1

## 2024-07-24 NOTE — PROGRESS NOTES
"Subjective   Patient ID: Joanna Kendrick is a 79 y.o. female who presents in follow up for management of trigeminal neuralgia.     Assessment/Plan     79 year old female with PMHx of trigeminal neuralgia s/p RFA currently controlled on 100 mg carbamazepine three times daily, currently complaining of left sided tongue pain.     #Trigeminal Neuralgia   #S/p RFA  -Facial pain improved  -Continue Carbamazepine 100 mg three times daily, discussed adjusting the regime to take 200 mg as the morning dose, attempt to skip the afternoon dose and continue with evening dose of 100 mg   -Discussed long term risk noting she was concerned with osteoporosis but reassured this is uncommon at her current dosing.   -Follow up in 1 year but patient is instructed to call if issue arise.       HPI  Joanna Kendrick is a 79 year old female with chief complaint of follow up in the trigeminal neuralgia. Last seen on 1/24/2024 and since then her symptoms of facial pain have improved only noting intermittent abnormal tongue sensation described as a burning sensation to the left side of tongue localized to the tip. This tongue complaint has occurred approximately 5 times over the past year. This current tongue complaint began 1 week ago but is not creating issue with speech or eating.     She is compliant with her carbamazepine 100 mg three time daily noting schedule (0830, 1430 and 2030).     Denies any other symptoms.       Visit Vitals  /72 (BP Location: Left arm, Patient Position: Sitting, BP Cuff Size: Adult)   Pulse 72   Temp 36.3 °C (97.3 °F) (Temporal)   Ht 1.6 m (5' 3\")   Wt 49 kg (108 lb)   LMP  (LMP Unknown)   BMI 19.13 kg/m²   OB Status Postmenopausal   Smoking Status Never   BSA 1.48 m²     PHYSICAL EXAM   Physical Exam  Vitals and nursing note reviewed.   Constitutional:       General: She is not in acute distress.  HENT:      Head: Normocephalic and atraumatic.      Mouth/Throat:      Mouth: Mucous membranes are moist.      " Pharynx: Oropharynx is clear.   Eyes:      Conjunctiva/sclera: Conjunctivae normal.      Pupils: Pupils are equal, round, and reactive to light.   Cardiovascular:      Rate and Rhythm: Normal rate and regular rhythm.      Pulses: Normal pulses.      Heart sounds: Normal heart sounds.   Pulmonary:      Effort: Pulmonary effort is normal.      Breath sounds: Normal breath sounds.   Abdominal:      General: Abdomen is flat.   Musculoskeletal:         General: Normal range of motion.      Cervical back: Normal range of motion and neck supple.      Right lower leg: No edema.      Left lower leg: No edema.   Skin:     General: Skin is warm and dry.      Capillary Refill: Capillary refill takes less than 2 seconds.   Neurological:      General: No focal deficit present.      Mental Status: She is alert and oriented to person, place, and time.   Psychiatric:         Mood and Affect: Mood normal.         Behavior: Behavior normal.          REVIEW OF SYSTEMS   Review of Systems   Neurological:  Positive for headaches. Negative for speech difficulty.        (+) abnormal tongue sensation    All other systems reviewed and are negative.        Allergies   Allergen Reactions    Penicillin G Anaphylaxis    Penicillins Anaphylaxis    Cipro [Ciprofloxacin Hcl] Unknown    Clindamycin Unknown    Sulfa (Sulfonamide Antibiotics) Hives       Current Outpatient Medications   Medication Sig Dispense Refill    ascorbic acid (Vitamin C) 1,000 mg tablet Take 1 tablet (1,000 mg) by mouth once daily.      carBAMazepine (TEGretol) 100 mg chewable tablet Take 1 tablet in the morning and take 2 tablets HS 90 tablet 3    cholecalciferol (Vitamin D-3) 50 mcg (2,000 unit) capsule Take 1 capsule (50 mcg) by mouth every other day.      coenzyme Q-10 200 mg capsule Take 1 capsule (200 mg) by mouth. TAKE AS DIRECTED.      cranberry fruit concentrate 500 mg capsule Take by mouth.      cyanocobalamin (Vitamin B-12) 1,000 mcg tablet Take 1 tablet (1,000 mcg)  by mouth once daily.      estradiol (Estrace) 0.01 % (0.1 mg/gram) vaginal cream APPLY A PEA-SIZED AMOUNT TO VAGINAL OPENING EVERY MONDAY, WEDNESDAY, AND FRIDAY EVENING.Insert into the vagina. 42.5 g 3    flaxseed oiL 1,000 mg capsule Take 1 capsule (1,000 mg) by mouth. TAKE AS DIRECTED.      glutathione, bulk, 100 % powder Take by mouth. CAPSULE. USE AS DIRECTED.      hydrocortisone (PROCTOSOL HC TOP) Apply 1 Application topically 4 times a day as needed.      L. acidophilus/Bifid. animalis 32 billion cell capsule Take by mouth.      metoprolol succinate XL (Toprol-XL) 50 mg 24 hr tablet TAKE 1 TABLET BY MOUTH DAILY 90 tablet 3    multivitamin capsule Take 1 capsule by mouth once daily.      NON FORMULARY Marine lipid concentrate 240-360-5 MG-MG-Unit caps      NON FORMULARY Cylospine in Klarity 0.1-25% compound BID      psyllium (Metamucil) 3.4 gram packet Take by mouth.      zinc gluconate-zinc picolinate 30 mg capsule Take 30 mg by mouth once daily.      zoledronic acid (Reclast) 5 mg/100 mL piggyback Infuse into a venous catheter.       No current facility-administered medications for this visit.       Objective     Office Visit on 04/30/2024   Component Date Value Ref Range Status    Color, Urine 04/30/2024 Yellow  Straw, Yellow Final    Appearance, Urine 04/30/2024 Clear  Clear Final    Specific Gravity, Urine 04/30/2024 1.008  1.005 - 1.035 Final    pH, Urine 04/30/2024 7.0  5.0, 5.5, 6.0, 6.5, 7.0, 7.5, 8.0 Final    Protein, Urine 04/30/2024 NEGATIVE  NEGATIVE mg/dL Final    Glucose, Urine 04/30/2024 NEGATIVE  NEGATIVE mg/dL Final    Blood, Urine 04/30/2024 NEGATIVE  NEGATIVE Final    Ketones, Urine 04/30/2024 NEGATIVE  NEGATIVE mg/dL Final    Bilirubin, Urine 04/30/2024 NEGATIVE  NEGATIVE Final    Urobilinogen, Urine 04/30/2024 <2.0  <2.0 mg/dL Final    Nitrite, Urine 04/30/2024 NEGATIVE  NEGATIVE Final    Leukocyte Esterase, Urine 04/30/2024 NEGATIVE  NEGATIVE Final    Urine Culture 04/30/2024 No growth    Final       Radiology: Reviewed imaging in powerchart.  No results found.    Family History   Problem Relation Name Age of Onset    Other (Pacemaker) Mother           age 92    Hip fracture Mother          s/p fall down stairs    Other (MVA-  young age) Father      Alcohol abuse Brother      Pancreatic cancer Brother           70    Cirrhosis Brother      Colon cancer Half-Sister       Social History     Socioeconomic History    Marital status:    Tobacco Use    Smoking status: Never    Smokeless tobacco: Never   Vaping Use    Vaping status: Never Used   Substance and Sexual Activity    Alcohol use: Never    Drug use: Never   Social History Narrative    Family History     · M: Pacemaker ( age 92); hip fracture s/p fall down stairs         F:  MVA young age        B: Pancreatic CA, cirrhosis, ETOH ( 70)        S: 1/2 sister. Colon CA ()        no biologic living siblings         Social History       · Former smoker (V15.82) (Z87.891)     · Quit in         smoked 2-3 years     · No history of alcohol use (Z78.9)     ·  (2015), lives with BF        Retired, Director of [employer]        3 kids          Past Medical History:   Diagnosis Date    H/O bone density study 10/02/2023    Lowest T score -1.8.    10-year Fracture Risk: Major Osteoporotic Fracture  19.6 Hip Fracture                        11.6    H/O bone density study     10/2021: -1.9. FRAX      FRAX* 10-year Probability of Fracture     Based on femoral neck BMD: DualFemur (Left)     Major Osteoporotic Fracture: 18.4%     Hip Fracture:        10.1%     3/15: Osteopenia. Major osteoporotic fracture 17%. Hip fracture 3.0%     : -1.9. 10-year absolute fracture risk:            - major osteoporotic fracture = 14.9 %            - hip fracture = 3.7 %:    H/O bone density study     10/19: Ten Year Major Osteoporotic Fracture Risk: 10.43%      Ten Year Hip Fracture Risk: 2.3%      T-Score: -1.9     H/O CT scan     10/19: CT calcium score =0    H/O CT scan of abdomen     2/15: Rectus abdominis diastasis. No ventral abdominal hernia. Partially calcified     splenic artery aneurysm measuring 0.9 cm is stable since May 2006.     4/18: Colonic diverticulosis.    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 There is a posteriorly directed regurgitant      jet. There is moderate thickening of the anterior mitral leaflet. There is prolapse of the     anterior mitral leaflet.    H/O magnetic resonance imaging 11/20/2023    MRI Pancreas:2. No definite pancreatic mass or ductal dilatation. 3. Diffuse biliary dilatation likely related to chronic cholecystectomy status considering lack of additional findings to indicate biliary obstruction. Specifically no biliary stricture, choledocholithiasis, or extrinsic compression is identified. Suggest correlation with LFTs. If clinical concern for biliary obstruction,ERCP    H/O x-ray of lumbar spine     09/2018 : Moderate multilevel degenerative disc narrowing with slight progression     compared to prior at the L2 -L5 levels. Facet arthrosis is most     prominent at L5-s1 bilaterally, L4-L5 on the left, and L3-L4 on the right     Past Surgical History:   Procedure Laterality Date    BLADDER SURGERY  05/30/2014    Bladder Surgery    CATARACT EXTRACTION  08/15/2018    Cataract Surgery    COLONOSCOPY      2015: diverticula were found in the sigmoid colon. External hemorrhoids.     10/18: Patent functional end-to-end ileo-colonic anastomosis,               characterized by healthy appearing mucosa.               - Moderate diverticulosis in the sigmoid colon, in the               descending colon and in the transverse colon. There was    evidence of an impacted diverticulum. External  hemorrhoids    COLONOSCOPY  10/05/2023    · Extensive diverticulosis of severe severity causing severe luminal narrowing in the transverse colon, descending colon and sigmoid colon · Healthy ileocolonic anastomosis in the hepatic flexure · Small hemorrhoids    GALLBLADDER SURGERY  10/04/2013    Gallbladder Surgery    HEMICOLECTOMY  08/15/2018    lap right hemicolectomy bc polyp was in appendix, LNs negative    HERNIA REPAIR  08/15/2018    Hernia Repair    OTHER SURGICAL HISTORY  05/30/2014    Mid-Urethral Sling Operation    OTHER SURGICAL HISTORY  09/13/2019    Blepharoplasty    OTHER SURGICAL HISTORY  07/07/2014    Laparoscopy Partial Colectomy       Charting was completed using voice recognition technology and may include unintended errors.    Assessment and plan discussed with attending physician, Dr. Parker.     Clayton Heath, DO  PGY-3, Internal Medicine [facility]

## 2024-08-07 DIAGNOSIS — Z79.2 PROPHYLACTIC ANTIBIOTIC: Primary | ICD-10-CM

## 2024-08-07 RX ORDER — NITROFURANTOIN 25; 75 MG/1; MG/1
CAPSULE ORAL
Qty: 90 CAPSULE | Refills: 0 | Status: SHIPPED | OUTPATIENT
Start: 2024-08-07

## 2024-08-14 DIAGNOSIS — G50.0 TRIGEMINUS NEURALGIA: ICD-10-CM

## 2024-08-14 RX ORDER — CARBAMAZEPINE 100 MG/1
TABLET, CHEWABLE ORAL
Qty: 90 TABLET | Refills: 3 | Status: SHIPPED | OUTPATIENT
Start: 2024-08-14

## 2024-08-22 ENCOUNTER — APPOINTMENT (OUTPATIENT)
Dept: UROLOGY | Facility: CLINIC | Age: 79
End: 2024-08-22
Payer: MEDICARE

## 2024-09-16 ENCOUNTER — TELEPHONE (OUTPATIENT)
Dept: PRIMARY CARE | Facility: CLINIC | Age: 79
End: 2024-09-16
Payer: MEDICARE

## 2024-09-16 NOTE — TELEPHONE ENCOUNTER
Patient left a VM asking for advice on what medication to take on their cruise in case they get upset stomachs from the different foods.  Please advise

## 2024-09-26 ENCOUNTER — APPOINTMENT (OUTPATIENT)
Dept: UROLOGY | Facility: CLINIC | Age: 79
End: 2024-09-26
Payer: MEDICARE

## 2024-09-26 VITALS
HEIGHT: 63 IN | BODY MASS INDEX: 19.14 KG/M2 | HEART RATE: 83 BPM | DIASTOLIC BLOOD PRESSURE: 84 MMHG | SYSTOLIC BLOOD PRESSURE: 146 MMHG | TEMPERATURE: 98.4 F | WEIGHT: 108 LBS

## 2024-09-26 DIAGNOSIS — N39.0 RECURRENT UTI: ICD-10-CM

## 2024-09-26 LAB
POC APPEARANCE, URINE: CLEAR
POC BILIRUBIN, URINE: NEGATIVE
POC BLOOD, URINE: NEGATIVE
POC COLOR, URINE: YELLOW
POC GLUCOSE, URINE: NEGATIVE MG/DL
POC KETONES, URINE: NEGATIVE MG/DL
POC LEUKOCYTES, URINE: ABNORMAL
POC NITRITE,URINE: POSITIVE
POC PH, URINE: 6 PH
POC PROTEIN, URINE: NEGATIVE MG/DL
POC SPECIFIC GRAVITY, URINE: 1.01
POC UROBILINOGEN, URINE: 0.2 EU/DL

## 2024-09-26 PROCEDURE — 1123F ACP DISCUSS/DSCN MKR DOCD: CPT | Performed by: UROLOGY

## 2024-09-26 PROCEDURE — 1159F MED LIST DOCD IN RCRD: CPT | Performed by: UROLOGY

## 2024-09-26 PROCEDURE — 87086 URINE CULTURE/COLONY COUNT: CPT

## 2024-09-26 PROCEDURE — 81003 URINALYSIS AUTO W/O SCOPE: CPT | Performed by: UROLOGY

## 2024-09-26 PROCEDURE — 87186 SC STD MICRODIL/AGAR DIL: CPT

## 2024-09-26 PROCEDURE — G2211 COMPLEX E/M VISIT ADD ON: HCPCS | Performed by: UROLOGY

## 2024-09-26 PROCEDURE — 99213 OFFICE O/P EST LOW 20 MIN: CPT | Performed by: UROLOGY

## 2024-09-26 PROCEDURE — 3077F SYST BP >= 140 MM HG: CPT | Performed by: UROLOGY

## 2024-09-26 PROCEDURE — 1157F ADVNC CARE PLAN IN RCRD: CPT | Performed by: UROLOGY

## 2024-09-26 PROCEDURE — 3079F DIAST BP 80-89 MM HG: CPT | Performed by: UROLOGY

## 2024-09-26 NOTE — PROGRESS NOTES
"HISTORY OF PRESENT ILLNESS:  This is a 79 y.o. female who presents for follow-up for urinary tract infection.     Urinary tract infection  prophylactic nitrofurantone after sexual intercourse, prescribed by Dr. Concepción Osborne  Vaginal mucosal atrophy  estrace used  s/p sling placement (MUS) 11/13/13  Disorder of bone density and structure  HTN  Diverticulosis   SHx includes:  gallbladder surgery  hemicolectomy (lap right hemicolectomy bc polyp was in appendix, LNs negative)  hernia repair  laparoscopy partial colectomy    Patient reports no new concerns. She is excited about her [place] cruise coming up.     NTFx: 4-5x a night; admits she drinks caffeine and leaves her lights on all night     No urgency, no flow issues, no vaginal bleeding or discharge. Antibiotics taken after intercourse. Estrogen cream adherence confirmed. No coffee at night from 4 hours before bed.     PHYSICAL EXAMINATION:  No LMP recorded (lmp unknown). Patient is postmenopausal.  Body mass index is 19.13 kg/m².  Visit Vitals  /84   Pulse 83   Temp 36.9 °C (98.4 °F)   Ht 1.6 m (5' 3\")   Wt 49 kg (108 lb)   LMP  (LMP Unknown)   BMI 19.13 kg/m²   OB Status Postmenopausal   Smoking Status Never   BSA 1.48 m²     General Appearance: well appearing  Neuro: Alert and oriented  Urine dip:   Recent Results (from the past 6 hour(s))   POCT UA Automated manually resulted    Collection Time: 09/26/24 11:56 AM   Result Value Ref Range    POC Color, Urine Yellow Straw, Yellow, Light-Yellow    POC Appearance, Urine Clear Clear    POC Glucose, Urine NEGATIVE NEGATIVE mg/dl    POC Bilirubin, Urine NEGATIVE NEGATIVE    POC Ketones, Urine NEGATIVE NEGATIVE mg/dl    POC Specific Gravity, Urine 1.015 1.005 - 1.035    POC Blood, Urine NEGATIVE NEGATIVE    POC PH, Urine 6.0 No Reference Range Established PH    POC Protein, Urine NEGATIVE NEGATIVE, 30 (1+) mg/dl    POC Urobilinogen, Urine 0.2 0.2, 1.0 EU/DL    Poc Nitrite, Urine POSITIVE (A) NEGATIVE    POC " Leukocytes, Urine SMALL (1+) (A) NEGATIVE       IMPRESSION AND PLAN:  Joanna Kendrick is a 79 y.o. who presents with hx of urinary tract infection. Her UA is positive today. We will send it out for culture despite being asymptomatic since she will be travelling. If positive cx, we will send her antibiotics to take with her in case she develops symptoms abroad. She will continue on estrogen cream and postcoital macrobid     Follow up in one year or sooner if needed.    Scribe Attestation  By signing my name below, Roselia POSADA Scribe   attest that this documentation has been prepared under the direction and in the presence of Alfredo Reddy MD.

## 2024-09-29 LAB — BACTERIA UR CULT: ABNORMAL

## 2024-09-30 DIAGNOSIS — Z86.79 PERSONAL HISTORY OF OTHER DISEASES OF THE CIRCULATORY SYSTEM: ICD-10-CM

## 2024-09-30 RX ORDER — METOPROLOL SUCCINATE 50 MG/1
50 TABLET, EXTENDED RELEASE ORAL DAILY
Qty: 90 TABLET | Refills: 3 | Status: SHIPPED | OUTPATIENT
Start: 2024-09-30

## 2024-10-01 ENCOUNTER — TELEPHONE (OUTPATIENT)
Dept: UROLOGY | Facility: CLINIC | Age: 79
End: 2024-10-01
Payer: MEDICARE

## 2024-10-01 DIAGNOSIS — N39.0 RECURRENT UTI: ICD-10-CM

## 2024-10-01 LAB — BACTERIA UR CULT: ABNORMAL

## 2024-10-01 RX ORDER — GRANULES FOR ORAL 3 G/1
3 POWDER ORAL ONCE
Qty: 3 G | Refills: 0 | Status: CANCELLED | OUTPATIENT
Start: 2024-10-01 | End: 2024-10-01

## 2024-10-01 RX ORDER — CEPHALEXIN 500 MG/1
500 CAPSULE ORAL 3 TIMES DAILY
Qty: 15 CAPSULE | Refills: 0 | Status: SHIPPED | OUTPATIENT
Start: 2024-10-01 | End: 2024-10-06

## 2024-10-01 NOTE — TELEPHONE ENCOUNTER
Attempted to return patient's calls regarding urine culture results. Due to patient's allergies, fosfomycin has been added as an extended sensitivity test. LVM for patient to return call to our clinic at her soonest convenience.

## 2024-10-01 NOTE — TELEPHONE ENCOUNTER
Patient states she is concerned with going on a 2 week cruise without antibiotic treatment for positive urine culture results. Patient is currently asymptomatic of an infection but request something for treatment incase she has any symptoms while on her cruise. Patient has allergy to penicillins where she states her throat becomes itchy but has taken keflex in October of 2022. Patient in agreement to take Keflex 500mg TID for 5 days if she becomes symptomatic on her cruise. Patient advised if she becomes symptomatic of an allergic reaction to stop medication immediately and seek medical attention. Patient expressed understand and is in agreement with plan of care.

## 2024-10-14 ENCOUNTER — OFFICE VISIT (OUTPATIENT)
Dept: PRIMARY CARE | Facility: CLINIC | Age: 79
End: 2024-10-14
Payer: MEDICARE

## 2024-10-14 VITALS
SYSTOLIC BLOOD PRESSURE: 131 MMHG | TEMPERATURE: 98.2 F | DIASTOLIC BLOOD PRESSURE: 68 MMHG | HEART RATE: 77 BPM | OXYGEN SATURATION: 98 %

## 2024-10-14 DIAGNOSIS — R39.9 UTI SYMPTOMS: ICD-10-CM

## 2024-10-14 DIAGNOSIS — H61.23 BILATERAL IMPACTED CERUMEN: ICD-10-CM

## 2024-10-14 DIAGNOSIS — U07.1 COVID-19: Primary | ICD-10-CM

## 2024-10-14 DIAGNOSIS — H66.90 ACUTE OTITIS MEDIA, UNSPECIFIED OTITIS MEDIA TYPE: ICD-10-CM

## 2024-10-14 DIAGNOSIS — H60.332 ACUTE SWIMMER'S EAR OF LEFT SIDE: ICD-10-CM

## 2024-10-14 PROCEDURE — 1159F MED LIST DOCD IN RCRD: CPT | Performed by: NURSE PRACTITIONER

## 2024-10-14 PROCEDURE — 1036F TOBACCO NON-USER: CPT | Performed by: NURSE PRACTITIONER

## 2024-10-14 PROCEDURE — 1123F ACP DISCUSS/DSCN MKR DOCD: CPT | Performed by: NURSE PRACTITIONER

## 2024-10-14 PROCEDURE — 1160F RVW MEDS BY RX/DR IN RCRD: CPT | Performed by: NURSE PRACTITIONER

## 2024-10-14 PROCEDURE — 1157F ADVNC CARE PLAN IN RCRD: CPT | Performed by: NURSE PRACTITIONER

## 2024-10-14 PROCEDURE — 99214 OFFICE O/P EST MOD 30 MIN: CPT | Performed by: NURSE PRACTITIONER

## 2024-10-14 PROCEDURE — 87086 URINE CULTURE/COLONY COUNT: CPT

## 2024-10-14 PROCEDURE — 3078F DIAST BP <80 MM HG: CPT | Performed by: NURSE PRACTITIONER

## 2024-10-14 PROCEDURE — 81003 URINALYSIS AUTO W/O SCOPE: CPT

## 2024-10-14 PROCEDURE — 69210 REMOVE IMPACTED EAR WAX UNI: CPT | Performed by: NURSE PRACTITIONER

## 2024-10-14 PROCEDURE — 3075F SYST BP GE 130 - 139MM HG: CPT | Performed by: NURSE PRACTITIONER

## 2024-10-14 RX ORDER — HYDROCORTISONE AND ACETIC ACID 20.75; 10.375 MG/ML; MG/ML
4 SOLUTION AURICULAR (OTIC) 4 TIMES DAILY
Qty: 10 ML | Refills: 0 | Status: SHIPPED | OUTPATIENT
Start: 2024-10-14 | End: 2024-10-16

## 2024-10-14 RX ORDER — FLUTICASONE PROPIONATE 50 MCG
1-2 SPRAY, SUSPENSION (ML) NASAL DAILY
Qty: 16 G | Refills: 5 | Status: SHIPPED | OUTPATIENT
Start: 2024-10-14 | End: 2025-10-14

## 2024-10-14 RX ORDER — DOXYCYCLINE 100 MG/1
100 CAPSULE ORAL 2 TIMES DAILY
Qty: 14 CAPSULE | Refills: 0 | Status: SHIPPED | OUTPATIENT
Start: 2024-10-14 | End: 2024-10-21

## 2024-10-14 NOTE — PATIENT INSTRUCTIONS
Flonase (fluticasone) one spray each nostril twice daily for 5-7 days, then once daily thereafter until symptoms resolve  Vosol HC drops for left ear for 5-7 days  Doxycyline for ear infection for 7 days  We will send out urine to rule out infection    Raymundo Maradiaga - Neurosurgeon of [facility]

## 2024-10-14 NOTE — PROGRESS NOTES
Subjective   Patient ID: Joanna Kendrick is a 79 y.o. female who presents for Sick Visit.    Went on trip to [place]  Sx onset 10/10/24  Tested 10/12/24  Fatigue   cough (not as severe) and improving  body aches & joint pain  feels not as sharp mentally as before   Hearing loss out of left ear   No sob or wheezing  No CP  Had a fever, has been 2 days  First time with covid          Review of Systems  ROS completely negative except what was mentioned in the HPI.  Problem List, surgical, social, and family histories which were reviewed and updated as necessary within the EMR. I also personally reviewed the notes, labs, and imaging that pertained to what was documented or discussed in the HPI.    Objective   Physical Exam  Vitals and nursing note reviewed.   Constitutional:       General: She is not in acute distress.     Appearance: Normal appearance.   HENT:      Head: Normocephalic and atraumatic.      Right Ear: External ear normal. Decreased hearing noted. Tenderness present. A middle ear effusion is present. Tympanic membrane is erythematous and bulging.      Left Ear: External ear normal. A middle ear effusion is present. Tympanic membrane is erythematous.      Ears:      Comments: Post bilateral cerumen removal     Nose: Nose normal.      Mouth/Throat:      Mouth: Mucous membranes are moist.   Eyes:      Extraocular Movements: Extraocular movements intact.      Conjunctiva/sclera: Conjunctivae normal.      Pupils: Pupils are equal, round, and reactive to light.   Cardiovascular:      Rate and Rhythm: Normal rate and regular rhythm.      Heart sounds: Normal heart sounds.   Pulmonary:      Effort: Pulmonary effort is normal.      Breath sounds: Normal breath sounds.   Musculoskeletal:         General: Normal range of motion.      Cervical back: Normal range of motion and neck supple.   Skin:     General: Skin is warm and dry.   Neurological:      General: No focal deficit present.      Mental Status: She is alert  and oriented to person, place, and time. Mental status is at baseline.   Psychiatric:         Mood and Affect: Mood normal.         Behavior: Behavior normal.         Thought Content: Thought content normal.         Judgment: Judgment normal.       /68   Pulse 77   Temp 36.8 °C (98.2 °F) (Temporal)   LMP  (LMP Unknown)   SpO2 98%     Assessment/Plan    Problem List Items Addressed This Visit    None  Visit Diagnoses       COVID-19    -  Primary    Sx onset 10/10, Covid + 10/12.  Discussed risks and benefits to antivirals.  Paxlovid contraindicated.  Pt declined molnupiravir    Acute otitis media, unspecified otitis media type        Relevant Medications    doxycycline (Vibramycin) 100 mg capsule    fluticasone (Flonase) 50 mcg/actuation nasal spray    Acute swimmer's ear of left side        Relevant Medications    hydrocortisone-acetic acid (Vosol-HC) otic solution    UTI symptoms        Relevant Orders    Urinalysis with Reflex Microscopic    Urine Culture    Bilateral impacted cerumen             Patient ID: Joanna Kendrick is a 79 y.o. female.    Ear Cerumen Removal    Date/Time: 10/14/2024 5:15 PM    Performed by: MIC Mobley  Authorized by: MIC Mobley    Consent:     Consent obtained:  Verbal    Consent given by:  Patient    Risks, benefits, and alternatives were discussed: yes      Risks discussed:  Bleeding, infection, incomplete removal and dizziness    Alternatives discussed:  No treatment and alternative treatment  Universal protocol:     Procedure explained and questions answered to patient or proxy's satisfaction: yes      Patient identity confirmed:  Verbally with patient  Procedure details:     Location:  L ear and R ear    Procedure type: curette      Procedure outcomes: cerumen removed    Post-procedure details:     Inspection:  TM intact, no bleeding and ear canal clear    Hearing quality:  Improved    Procedure completion:  Tolerated well, no immediate  complications

## 2024-10-15 ENCOUNTER — TELEPHONE (OUTPATIENT)
Dept: PRIMARY CARE | Facility: CLINIC | Age: 79
End: 2024-10-15
Payer: MEDICARE

## 2024-10-15 LAB
APPEARANCE UR: CLEAR
BILIRUB UR STRIP.AUTO-MCNC: NEGATIVE MG/DL
COLOR UR: NORMAL
GLUCOSE UR STRIP.AUTO-MCNC: NORMAL MG/DL
KETONES UR STRIP.AUTO-MCNC: NEGATIVE MG/DL
LEUKOCYTE ESTERASE UR QL STRIP.AUTO: NEGATIVE
NITRITE UR QL STRIP.AUTO: NEGATIVE
PH UR STRIP.AUTO: 6.5 [PH]
PROT UR STRIP.AUTO-MCNC: NEGATIVE MG/DL
RBC # UR STRIP.AUTO: NEGATIVE /UL
SP GR UR STRIP.AUTO: 1.01
UROBILINOGEN UR STRIP.AUTO-MCNC: NORMAL MG/DL

## 2024-10-15 NOTE — TELEPHONE ENCOUNTER
Spoke with patient.  Relayed she could try putting it in applesauce or pudding to help go down.  Patient is going to try this and will let me know.  Patient stated she can not drink a full glass of water after because that hurts her stomach.

## 2024-10-15 NOTE — TELEPHONE ENCOUNTER
Patient left  stating she is unable to take the Doxycyline that you prescribed yesterday.Patient states it is not going down right. She stated she is waiting on the ear drops from the pharmacy. She stated she needs something else- she said you mentioned Keflex.

## 2024-10-16 ENCOUNTER — TELEPHONE (OUTPATIENT)
Dept: PRIMARY CARE | Facility: CLINIC | Age: 79
End: 2024-10-16

## 2024-10-16 DIAGNOSIS — H60.332 ACUTE SWIMMER'S EAR OF LEFT SIDE: Primary | ICD-10-CM

## 2024-10-16 RX ORDER — NEOMYCIN SULFATE, POLYMYXIN B SULFATE, HYDROCORTISONE 3.5; 10000; 1 MG/ML; [USP'U]/ML; MG/ML
2 SOLUTION/ DROPS AURICULAR (OTIC) 4 TIMES DAILY
Qty: 10 ML | Refills: 0 | Status: SHIPPED | OUTPATIENT
Start: 2024-10-16 | End: 2024-10-23

## 2024-10-16 NOTE — TELEPHONE ENCOUNTER
Pt called states the ear drops you prescribed are $92 that's to much for her is there anything else you can prescribe

## 2024-10-17 LAB — BACTERIA UR CULT: NO GROWTH

## 2024-10-18 NOTE — TELEPHONE ENCOUNTER
Spoke with patient.  Relayed message to stop Doxycycline and keep doing the ear drops.  Patient declined new antibiotic because she is feeling better- the drops have helped a lot.  Patient verbally understood.    Updated allergy list

## 2024-10-18 NOTE — TELEPHONE ENCOUNTER
Add it to allergy list  w/side effects  Yes stop  Are her symptoms improved  If not at all would have her see ENT (she had hearing loss according to [facility] note)  If imporved but still present we could finish course of abx with zpack if tolerates it, can do 500mg daily x 3 days

## 2024-10-18 NOTE — TELEPHONE ENCOUNTER
Patient left VM stating the Doxycycline is wreaking havoc on her stomach and wants to know if she can stop. She has been taking with food and glass of water. She has completed 4 days and is using ear drops.   Please advise

## 2024-10-31 ENCOUNTER — TELEPHONE (OUTPATIENT)
Dept: PRIMARY CARE | Facility: CLINIC | Age: 79
End: 2024-10-31
Payer: MEDICARE

## 2024-10-31 DIAGNOSIS — R11.0 NAUSEA: ICD-10-CM

## 2024-10-31 RX ORDER — ONDANSETRON 4 MG/1
4 TABLET, FILM COATED ORAL EVERY 8 HOURS PRN
Qty: 21 TABLET | Refills: 0 | Status: SHIPPED | OUTPATIENT
Start: 2024-10-31 | End: 2024-11-07

## 2024-11-14 ENCOUNTER — CLINICAL SUPPORT (OUTPATIENT)
Dept: PRIMARY CARE | Facility: CLINIC | Age: 79
End: 2024-11-14
Payer: MEDICARE

## 2024-11-14 DIAGNOSIS — Z23 ENCOUNTER FOR ADMINISTRATION OF VACCINE: ICD-10-CM

## 2024-11-14 PROCEDURE — 90662 IIV NO PRSV INCREASED AG IM: CPT | Performed by: INTERNAL MEDICINE

## 2024-11-14 PROCEDURE — G0008 ADMIN INFLUENZA VIRUS VAC: HCPCS | Performed by: INTERNAL MEDICINE

## 2024-11-22 ENCOUNTER — TELEPHONE (OUTPATIENT)
Dept: NEUROLOGY | Facility: CLINIC | Age: 79
End: 2024-11-22
Payer: MEDICARE

## 2024-11-22 DIAGNOSIS — G50.0 TRIGEMINUS NEURALGIA: Primary | ICD-10-CM

## 2024-11-22 RX ORDER — CARBAMAZEPINE 200 MG/1
TABLET ORAL
Qty: 45 TABLET | Refills: 11 | Status: SHIPPED | OUTPATIENT
Start: 2024-11-22

## 2024-11-25 ENCOUNTER — LAB (OUTPATIENT)
Dept: LAB | Facility: LAB | Age: 79
End: 2024-11-25
Payer: MEDICARE

## 2024-11-25 DIAGNOSIS — E78.2 HYPERLIPIDEMIA, MIXED: ICD-10-CM

## 2024-11-25 DIAGNOSIS — N39.0 RECURRENT UTI: ICD-10-CM

## 2024-11-25 DIAGNOSIS — R35.0 URINARY FREQUENCY: ICD-10-CM

## 2024-11-25 DIAGNOSIS — E55.9 VITAMIN D DEFICIENCY: ICD-10-CM

## 2024-11-25 DIAGNOSIS — I10 PRIMARY HYPERTENSION: ICD-10-CM

## 2024-11-25 LAB
25(OH)D3 SERPL-MCNC: 72 NG/ML (ref 30–100)
ALBUMIN SERPL BCP-MCNC: 4.3 G/DL (ref 3.4–5)
ALP SERPL-CCNC: 63 U/L (ref 33–136)
ALT SERPL W P-5'-P-CCNC: 12 U/L (ref 7–45)
ANION GAP SERPL CALC-SCNC: 10 MMOL/L (ref 10–20)
APPEARANCE UR: CLEAR
AST SERPL W P-5'-P-CCNC: 19 U/L (ref 9–39)
BASOPHILS # BLD AUTO: 0.06 X10*3/UL (ref 0–0.1)
BASOPHILS NFR BLD AUTO: 1.5 %
BILIRUB SERPL-MCNC: 0.4 MG/DL (ref 0–1.2)
BILIRUB UR STRIP.AUTO-MCNC: NEGATIVE MG/DL
BUN SERPL-MCNC: 10 MG/DL (ref 6–23)
CALCIUM SERPL-MCNC: 9.9 MG/DL (ref 8.6–10.3)
CHLORIDE SERPL-SCNC: 102 MMOL/L (ref 98–107)
CHOLEST SERPL-MCNC: 283 MG/DL (ref 0–199)
CHOLESTEROL/HDL RATIO: 3.1
CO2 SERPL-SCNC: 29 MMOL/L (ref 21–32)
COLOR UR: YELLOW
CREAT SERPL-MCNC: 0.73 MG/DL (ref 0.5–1.05)
EGFRCR SERPLBLD CKD-EPI 2021: 85 ML/MIN/1.73M*2
EOSINOPHIL # BLD AUTO: 0.1 X10*3/UL (ref 0–0.4)
EOSINOPHIL NFR BLD AUTO: 2.5 %
ERYTHROCYTE [DISTWIDTH] IN BLOOD BY AUTOMATED COUNT: 12.3 % (ref 11.5–14.5)
GLUCOSE SERPL-MCNC: 93 MG/DL (ref 74–99)
GLUCOSE UR STRIP.AUTO-MCNC: NORMAL MG/DL
HCT VFR BLD AUTO: 37.6 % (ref 36–46)
HDLC SERPL-MCNC: 90.5 MG/DL
HGB BLD-MCNC: 12.5 G/DL (ref 12–16)
IMM GRANULOCYTES # BLD AUTO: 0.01 X10*3/UL (ref 0–0.5)
IMM GRANULOCYTES NFR BLD AUTO: 0.3 % (ref 0–0.9)
KETONES UR STRIP.AUTO-MCNC: NEGATIVE MG/DL
LDLC SERPL CALC-MCNC: 172 MG/DL
LEUKOCYTE ESTERASE UR QL STRIP.AUTO: NEGATIVE
LYMPHOCYTES # BLD AUTO: 1.02 X10*3/UL (ref 0.8–3)
LYMPHOCYTES NFR BLD AUTO: 26 %
MCH RBC QN AUTO: 33 PG (ref 26–34)
MCHC RBC AUTO-ENTMCNC: 33.2 G/DL (ref 32–36)
MCV RBC AUTO: 99 FL (ref 80–100)
MONOCYTES # BLD AUTO: 0.29 X10*3/UL (ref 0.05–0.8)
MONOCYTES NFR BLD AUTO: 7.4 %
NEUTROPHILS # BLD AUTO: 2.45 X10*3/UL (ref 1.6–5.5)
NEUTROPHILS NFR BLD AUTO: 62.3 %
NITRITE UR QL STRIP.AUTO: NEGATIVE
NON HDL CHOLESTEROL: 193 MG/DL (ref 0–149)
NRBC BLD-RTO: 0 /100 WBCS (ref 0–0)
PH UR STRIP.AUTO: 7 [PH]
PLATELET # BLD AUTO: 304 X10*3/UL (ref 150–450)
POTASSIUM SERPL-SCNC: 4.4 MMOL/L (ref 3.5–5.3)
PROT SERPL-MCNC: 6.7 G/DL (ref 6.4–8.2)
PROT UR STRIP.AUTO-MCNC: NEGATIVE MG/DL
RBC # BLD AUTO: 3.79 X10*6/UL (ref 4–5.2)
RBC # UR STRIP.AUTO: NEGATIVE /UL
SODIUM SERPL-SCNC: 137 MMOL/L (ref 136–145)
SP GR UR STRIP.AUTO: 1.01
TRIGL SERPL-MCNC: 103 MG/DL (ref 0–149)
TSH SERPL-ACNC: 2.77 MIU/L (ref 0.44–3.98)
UROBILINOGEN UR STRIP.AUTO-MCNC: NORMAL MG/DL
VLDL: 21 MG/DL (ref 0–40)
WBC # BLD AUTO: 3.9 X10*3/UL (ref 4.4–11.3)

## 2024-11-25 PROCEDURE — 80053 COMPREHEN METABOLIC PANEL: CPT

## 2024-11-25 PROCEDURE — 36415 COLL VENOUS BLD VENIPUNCTURE: CPT

## 2024-11-25 PROCEDURE — 81003 URINALYSIS AUTO W/O SCOPE: CPT

## 2024-11-25 PROCEDURE — 84443 ASSAY THYROID STIM HORMONE: CPT

## 2024-11-25 PROCEDURE — 85025 COMPLETE CBC W/AUTO DIFF WBC: CPT

## 2024-11-25 PROCEDURE — 80061 LIPID PANEL: CPT

## 2024-11-25 PROCEDURE — 82306 VITAMIN D 25 HYDROXY: CPT

## 2024-12-03 ENCOUNTER — APPOINTMENT (OUTPATIENT)
Dept: PRIMARY CARE | Facility: CLINIC | Age: 79
End: 2024-12-03
Payer: MEDICARE

## 2024-12-03 VITALS
DIASTOLIC BLOOD PRESSURE: 66 MMHG | SYSTOLIC BLOOD PRESSURE: 98 MMHG | OXYGEN SATURATION: 98 % | HEIGHT: 63 IN | HEART RATE: 82 BPM | WEIGHT: 108.5 LBS | BODY MASS INDEX: 19.22 KG/M2

## 2024-12-03 DIAGNOSIS — Z13.9 ENCOUNTER FOR SCREENING INVOLVING SOCIAL DETERMINANTS OF HEALTH (SDOH): ICD-10-CM

## 2024-12-03 DIAGNOSIS — Z00.00 ROUTINE ADULT HEALTH MAINTENANCE: Primary | ICD-10-CM

## 2024-12-03 DIAGNOSIS — Z86.79 H/O SUPRAVENTRICULAR TACHYCARDIA: ICD-10-CM

## 2024-12-03 DIAGNOSIS — Z13.6 SCREENING FOR CARDIOVASCULAR CONDITION: ICD-10-CM

## 2024-12-03 DIAGNOSIS — Z13.89 SCREENING FOR MULTIPLE CONDITIONS: ICD-10-CM

## 2024-12-03 DIAGNOSIS — R29.898 LOWER LIMB SYMPTOM: ICD-10-CM

## 2024-12-03 DIAGNOSIS — Z71.89 CARDIAC RISK COUNSELING: ICD-10-CM

## 2024-12-03 DIAGNOSIS — M85.9 DISORDER OF BONE DENSITY AND STRUCTURE, UNSPECIFIED: ICD-10-CM

## 2024-12-03 DIAGNOSIS — Z01.419 ENCOUNTER FOR GYNECOLOGICAL EXAMINATION WITHOUT ABNORMAL FINDING: ICD-10-CM

## 2024-12-03 DIAGNOSIS — R11.0 NAUSEA: ICD-10-CM

## 2024-12-03 DIAGNOSIS — E78.2 HYPERLIPIDEMIA, MIXED: ICD-10-CM

## 2024-12-03 DIAGNOSIS — Z78.0 MENOPAUSE: ICD-10-CM

## 2024-12-03 DIAGNOSIS — E55.9 VITAMIN D DEFICIENCY: ICD-10-CM

## 2024-12-03 DIAGNOSIS — Z12.31 ENCOUNTER FOR SCREENING MAMMOGRAM FOR BREAST CANCER: ICD-10-CM

## 2024-12-03 DIAGNOSIS — G50.0 TRIGEMINUS NEURALGIA: ICD-10-CM

## 2024-12-03 DIAGNOSIS — Z13.39 ALCOHOL SCREENING: ICD-10-CM

## 2024-12-03 DIAGNOSIS — D72.819 LEUKOPENIA, UNSPECIFIED TYPE: ICD-10-CM

## 2024-12-03 DIAGNOSIS — K83.8 DILATION OF BILIARY TRACT: ICD-10-CM

## 2024-12-03 DIAGNOSIS — Z71.89 ADVANCED DIRECTIVES, COUNSELING/DISCUSSION: ICD-10-CM

## 2024-12-03 PROBLEM — I10 WHITE COAT SYNDROME WITH DIAGNOSIS OF HYPERTENSION: Status: RESOLVED | Noted: 2023-03-20 | Resolved: 2024-12-03

## 2024-12-03 PROCEDURE — G0444 DEPRESSION SCREEN ANNUAL: HCPCS | Performed by: INTERNAL MEDICINE

## 2024-12-03 PROCEDURE — 1160F RVW MEDS BY RX/DR IN RCRD: CPT | Performed by: INTERNAL MEDICINE

## 2024-12-03 PROCEDURE — 99214 OFFICE O/P EST MOD 30 MIN: CPT | Performed by: INTERNAL MEDICINE

## 2024-12-03 PROCEDURE — 1159F MED LIST DOCD IN RCRD: CPT | Performed by: INTERNAL MEDICINE

## 2024-12-03 PROCEDURE — G0446 INTENS BEHAVE THER CARDIO DX: HCPCS | Performed by: INTERNAL MEDICINE

## 2024-12-03 PROCEDURE — G0101 CA SCREEN;PELVIC/BREAST EXAM: HCPCS | Performed by: INTERNAL MEDICINE

## 2024-12-03 PROCEDURE — 1123F ACP DISCUSS/DSCN MKR DOCD: CPT | Performed by: INTERNAL MEDICINE

## 2024-12-03 PROCEDURE — 1036F TOBACCO NON-USER: CPT | Performed by: INTERNAL MEDICINE

## 2024-12-03 PROCEDURE — 99497 ADVNCD CARE PLAN 30 MIN: CPT | Performed by: INTERNAL MEDICINE

## 2024-12-03 PROCEDURE — G0439 PPPS, SUBSEQ VISIT: HCPCS | Performed by: INTERNAL MEDICINE

## 2024-12-03 PROCEDURE — 1157F ADVNC CARE PLAN IN RCRD: CPT | Performed by: INTERNAL MEDICINE

## 2024-12-03 RX ORDER — OXCARBAZEPINE 150 MG/1
150 TABLET, FILM COATED ORAL 2 TIMES DAILY
Start: 2024-12-03 | End: 2024-12-05 | Stop reason: SDUPTHER

## 2024-12-03 RX ORDER — POLYETHYLENE GLYCOL 3350 17 G/17G
17 POWDER, FOR SOLUTION ORAL DAILY
Qty: 30 PACKET | Refills: 3 | Status: SHIPPED | OUTPATIENT
Start: 2024-12-03 | End: 2025-04-02

## 2024-12-03 RX ORDER — ONDANSETRON 4 MG/1
4 TABLET, FILM COATED ORAL EVERY 8 HOURS PRN
Qty: 90 TABLET | Refills: 3 | Status: SHIPPED | OUTPATIENT
Start: 2024-12-03 | End: 2025-12-03

## 2024-12-03 SDOH — ECONOMIC STABILITY: FOOD INSECURITY: WITHIN THE PAST 12 MONTHS, YOU WORRIED THAT YOUR FOOD WOULD RUN OUT BEFORE YOU GOT MONEY TO BUY MORE.: NEVER TRUE

## 2024-12-03 SDOH — ECONOMIC STABILITY: INCOME INSECURITY: IN THE LAST 12 MONTHS, WAS THERE A TIME WHEN YOU WERE NOT ABLE TO PAY THE MORTGAGE OR RENT ON TIME?: NO

## 2024-12-03 SDOH — ECONOMIC STABILITY: FOOD INSECURITY: WITHIN THE PAST 12 MONTHS, THE FOOD YOU BOUGHT JUST DIDN'T LAST AND YOU DIDN'T HAVE MONEY TO GET MORE.: NEVER TRUE

## 2024-12-03 SDOH — ECONOMIC STABILITY: TRANSPORTATION INSECURITY
IN THE PAST 12 MONTHS, HAS THE LACK OF TRANSPORTATION KEPT YOU FROM MEDICAL APPOINTMENTS OR FROM GETTING MEDICATIONS?: NO

## 2024-12-03 SDOH — ECONOMIC STABILITY: TRANSPORTATION INSECURITY
IN THE PAST 12 MONTHS, HAS LACK OF TRANSPORTATION KEPT YOU FROM MEETINGS, WORK, OR FROM GETTING THINGS NEEDED FOR DAILY LIVING?: NO

## 2024-12-03 ASSESSMENT — PATIENT HEALTH QUESTIONNAIRE - PHQ9
SUM OF ALL RESPONSES TO PHQ9 QUESTIONS 1 AND 2: 0
2. FEELING DOWN, DEPRESSED OR HOPELESS: NOT AT ALL
2. FEELING DOWN, DEPRESSED OR HOPELESS: NOT AT ALL
1. LITTLE INTEREST OR PLEASURE IN DOING THINGS: NOT AT ALL
1. LITTLE INTEREST OR PLEASURE IN DOING THINGS: NOT AT ALL
SUM OF ALL RESPONSES TO PHQ9 QUESTIONS 1 AND 2: 0

## 2024-12-03 ASSESSMENT — SOCIAL DETERMINANTS OF HEALTH (SDOH): IN THE PAST 12 MONTHS, HAS THE ELECTRIC, GAS, OIL, OR WATER COMPANY THREATENED TO SHUT OFF SERVICE IN YOUR HOME?: NO

## 2024-12-03 NOTE — PROGRESS NOTES
Annual Medicare Wellness Exam/Comprehensive Problem Focused Follow Up  HPI/CC  Chief Complaint   Patient presents with    Medicare Annual Wellness Visit Subsequent     Since COVID has had nausea and legs feels odd, bother her  Lost weight, has recovered (last year 110lbs)  Using Zofran  Comes in waves  Is on tegretol  Cscope last year was good  Feels like has to have BM and nothing happens  Has BM every 3 days (that's normal for her)  No new meds    Reviewed MRI pancreas 11/23:  1.  Exam limited due to motion artifact and lack of IV contrast.  2. No definite pancreatic mass or ductal dilatation.  3. Diffuse biliary dilatation likely related to chronic  cholecystectomy status considering lack of additional findings to  indicate biliary obstruction. Specifically no biliary stricture,  choledocholithiasis, or extrinsic compression is identified. Suggest  correlation with LFTs. If there is clinical concern for biliary  obstruction, consider ERCP.    Saw Neuro in July (Copied)  TGN. Having some breakthrough pain on the tip of her left tongue.  Will adjust her carbamazepine which is currently 100mg TID (300mg/day) to 200mg AM, 100mg afternoon/evening (300mg/day). If still having breakthrough pain then increase to 200mg AM, 100mg afternoon, 100mg at night (400mg/day)  Follow-up in 1 year or sooner if needed     Tegretol Helps but causes GI AE  Per Neuro(Copied): She has nausea and feels like she has a pit in her stomach, not every day but most of the time. She has an abnormal taste in her mouth  Will try changing to 200mg tablet instead of 100mg chewable tablet   If still having GI symptoms, then may consider oxcarbazepine     BP better than it was  No Dizziness or LH    Labs reviewed:  Component      Latest Ref Rng 11/25/2024   WBC      4.4 - 11.3 x10*3/uL 3.9 (L)    nRBC      0.0 - 0.0 /100 WBCs 0.0    RBC      4.00 - 5.20 x10*6/uL 3.79 (L)    HEMOGLOBIN      12.0 - 16.0 g/dL 12.5    HEMATOCRIT      36.0 - 46.0 % 37.6     MCV      80 - 100 fL 99    MCH      26.0 - 34.0 pg 33.0    MCHC      32.0 - 36.0 g/dL 33.2    RED CELL DISTRIBUTION WIDTH      11.5 - 14.5 % 12.3    Platelets      150 - 450 x10*3/uL 304    Neutrophils %      40.0 - 80.0 % 62.3    Immature Granulocytes %, Automated      0.0 - 0.9 % 0.3    Lymphocytes %      13.0 - 44.0 % 26.0    Monocytes %      2.0 - 10.0 % 7.4    Eosinophils %      0.0 - 6.0 % 2.5    Basophils %      0.0 - 2.0 % 1.5    Neutrophils Absolute      1.60 - 5.50 x10*3/uL 2.45    Immature Granulocytes Absolute, Automated      0.00 - 0.50 x10*3/uL 0.01    Lymphocytes Absolute      0.80 - 3.00 x10*3/uL 1.02    Monocytes Absolute      0.05 - 0.80 x10*3/uL 0.29    Eosinophils Absolute      0.00 - 0.40 x10*3/uL 0.10    Basophils Absolute      0.00 - 0.10 x10*3/uL 0.06    GLUCOSE      74 - 99 mg/dL 93    SODIUM      136 - 145 mmol/L 137    POTASSIUM      3.5 - 5.3 mmol/L 4.4    CHLORIDE      98 - 107 mmol/L 102    Bicarbonate      21 - 32 mmol/L 29    Anion Gap      10 - 20 mmol/L 10    Blood Urea Nitrogen      6 - 23 mg/dL 10    Creatinine      0.50 - 1.05 mg/dL 0.73    EGFR      >60 mL/min/1.73m*2 85    Calcium      8.6 - 10.3 mg/dL 9.9    Albumin      3.4 - 5.0 g/dL 4.3    Alkaline Phosphatase      33 - 136 U/L 63    Total Protein      6.4 - 8.2 g/dL 6.7    AST      9 - 39 U/L 19    Bilirubin Total      0.0 - 1.2 mg/dL 0.4    ALT      7 - 45 U/L 12    Color, Urine      Light-Yellow, Yellow, Dark-Yellow  Yellow    Appearance, Urine      Clear  Clear    Specific Gravity, Urine      1.005 - 1.035  1.010    pH, Urine      5.0, 5.5, 6.0, 6.5, 7.0, 7.5, 8.0  7.0    Protein, Urine      NEGATIVE, 10 (TRACE), 20 (TRACE) mg/dL NEGATIVE    Glucose, Urine      Normal mg/dL Normal    Blood, Urine      NEGATIVE  NEGATIVE    Ketones, Urine      NEGATIVE mg/dL NEGATIVE    Bilirubin, Urine      NEGATIVE  NEGATIVE    Urobilinogen, Urine      Normal mg/dL Normal    Nitrite, Urine      NEGATIVE  NEGATIVE    Leukocyte  Esterase, Urine      NEGATIVE  NEGATIVE    CHOLESTEROL      0 - 199 mg/dL 283 (H)    HDL CHOLESTEROL      mg/dL 90.5    Cholesterol/HDL Ratio 3.1    LDL Calculated      <=99 mg/dL 172 (H)    VLDL      0 - 40 mg/dL 21    TRIGLYCERIDES      0 - 149 mg/dL 103    Non HDL Cholesterol      0 - 149 mg/dL 193 (H)    Vitamin D, 25-Hydroxy, Total      30 - 100 ng/mL 72    Thyroid Stimulating Hormone      0.44 - 3.98 mIU/L 2.77      RBC  4.00 - 5.20 x10*6/uL 3.79 Low  3.74 Low  3.96 Low  R 4.17 R 4.05 R 3.94 Low      She had COVID 10/14/24  On tegretol    Of note 10/19: Coronary artery calcium score of 0       Assessment and Plan:  Problem List Items Addressed This Visit          High    Routine adult health maintenance - Primary    Overview         COVID-19 Pfizer 2/26/21, 3/16/2021, 10/10/21      Influenza Seasonal 10/6/2016, 11/12/2015, 9/13/19, 10/18/23     , 11/14/24            Pneumococcal-13 Vac Conjugate9/2/2016      Pneumovax 9/11/17      Prevnar 20 11/7/23      Cscope 10/18 (5yrs), 10/5/23 (no further scopes needed, consider Cologuard in 5yrs)      BMD 4/17; 10/19; 10/21; 10/2/23      Mamm 3/28/19; 6/10/20; 6/11/21; 6/13/22, 6/15/23, 6/3/24      PAP/HPV 9/25/20 (-)      10/27/22: Current 10-Year ASCVD Risk: 22.68% - High Risk      10/19: Coronary artery calcium score of 0       5/28/24 Cuff Comparison & home readings reviewed      Office cuff:  111/68  78      Home cuff:  117/71  80           Current Assessment & Plan     Annual Wellness exam completed   Preventive Health History reviewed  Ordered:   Labs    Mammogram   BMD   Shingrix at pharmacy  RSV discussed           Relevant Medications    zoster vaccine-recombinant adjuvanted (Shingrix) 50 mcg/0.5 mL vaccine       Medium    Advanced directives, counseling/discussion    Overview     12/3/24: Pia Conway (daughter) is HCPOA  FULL CODE now  Doesnt want to be kept alive on machines if terminal  DNR Cardiac arrest if has a terminal condition         Alcohol  screening    Overview     12/03/24: 5 minutes spent screening for alcohol misuse  See snapshot (social documentation) for details.           Cardiac risk counseling    Overview     12/3/24: Current 10-Year ASCVD Risk: 28% - High Risk   no ASA rec'd per recent guidelines (risk> benefit)         Dilation of biliary tract    Overview     MRI 11/23: Diffuse biliary dilatation likely related to chronic  cholecystectomy status considering lack of additional findings to  indicate biliary obstruction. Specifically no biliary stricture,  choledocholithiasis, or extrinsic compression is identified. Suggest  correlation with LFTs. If there is clinical concern for biliary  obstruction, consider ERCP.         Current Assessment & Plan     Given nausea and new cosntipation will get repeat MRI         Relevant Medications    ondansetron (Zofran) 4 mg tablet    Other Relevant Orders    MR abdomen w and wo IV contrast MRCP    Disorder of bone density and structure, unspecified    Overview     BMD 10/2/23: Lowest T score -1.8.   10-year Fracture Risk:   Major Osteoporotic Fracture 19.6%  Hip Fracture 11. 6%  S/p Reclast 9/17, then 1/10/2022         Current Assessment & Plan     Would continue Reclast  Plamn for Qoyear if FRAX remains elevated         Encounter for gynecological examination without abnormal finding    Encounter for screening involving social determinants of health (SDoH)    Overview     12/03/24: 5 minutes spent on SDOH screening.  Specifically: Housing, Food Insecurity, Utilities and Transportatin Needs were evaluated   (See Screenings in Rooming section for documentation)           Encounter for screening mammogram for breast cancer    Relevant Orders    BI mammo bilateral screening tomosynthesis    H/O supraventricular tachycardia    Overview     On BB, continue          Hyperlipidemia, mixed    Overview     2019: Coronary artery calcium score of 0   Managed with diet/exercise;         Current Assessment & Plan      "Repeat CT  If (+) plaque will start statin         Relevant Orders    TSH with reflex to Free T4 if abnormal    Comprehensive Metabolic Panel    CBC and Auto Differential    Lipid Panel    Urinalysis with Reflex Microscopic    CT cardiac scoring wo IV contrast    Leukopenia    Overview     And decrease RBC count also  ? Due to Tegretol         Current Assessment & Plan     Is changing meds  Repeat CBC after that         Relevant Orders    CBC and Auto Differential    Menopause    Relevant Orders    XR DEXA bone density    Screening for multiple conditions    Overview     Depression screening completed using the PHQ-2 questions with results documented in the chart/encounter  (See Rooming Screenings for documentation)           Trigeminus neuralgia    Overview     S/p left sided trigeminal nerve RF to assist with [variable]-sided trigeminal neuralgia  On Tegretol 200mg daily (AE)            Current Assessment & Plan     Changing to Trileptal soon         Relevant Medications    OXcarbazepine (TrileptaL) 150 mg tablet    Vitamin D deficiency    Relevant Orders    Vitamin D 25-Hydroxy,Total (for eval of Vitamin D levels)     Other Visit Diagnoses       Screening for cardiovascular condition        Relevant Orders    CT cardiac scoring wo IV contrast    Nausea        ? due to Tegretol  ? r/t MRI findings lasyt year:Diffuse biliary dilatation likely related to chronic  cholecystectomy status  Try Miralax for constipation    Relevant Medications    ondansetron (Zofran) 4 mg tablet    polyethylene glycol (Glycolax, Miralax) 17 gram packet    Other Relevant Orders    MR abdomen w and wo IV contrast MRCP    Lower limb symptom        Legs feel tired   Since COVID  check labs    Relevant Orders    Sedimentation Rate    C-reactive protein    Myoglobin, serum    CK            ROS otherwise negative aside from what was mentioned above in HPI.    Vitals  BP 98/66   Pulse 82   Ht 1.607 m (5' 3.25\")   Wt 49.2 kg (108 lb 8 oz)   " LMP  (LMP Unknown)   SpO2 98%   BMI 19.07 kg/m²   Body mass index is 19.07 kg/m².  Physical Exam  Gen: Alert, NAD  HEENT:  Unremarkable  Neck:  No ALFONSO  Respiratory:  Lungs CTAB  Cardiovascular:  Heart RRR  Neuro:  Gross motor and sensory intact  Skin:  No suspicious lesions present  Breast: No masses, or axillary lymphadenopathy  Abd: Soft, + Tympanic and mildly distended, NT and w/o R/G/R  Gyn: Normal pelvic exam: no uterine masses or cervical lesions, or CMT; no vaginal D/C. No ovarian or adnexal masses; No external vaginal or anal/perineal lesions (Pt declined chaperone)      Patient Care Team:  Jessa Osborne MD as PCP - General (Internal Medicine)  Jessa Osborne MD as PCP - [facility]ributed [facility]  Alfredo Reddy MD as Surgeon (Urology)  Lew Kim MD as Consulting Physician (Dermatology)  Ann Braga MD as Anesthesiologist (Pain Medicine)       Health Risk Assessment:  Patient was asked if he/she has any difficulty performing the following activities of daily living:  Preparing nutritious food and eating? No  Grocery shopping? No  Bathing and grooming yourself? No  Getting dressed? No  Using the toilet?No  Using the phone? No  Moving around from place to place (physical ambulation)? No  Doing housework (including laundry) independently? No  Managing finances independently? No  Managing medications independently? No  Doing housework (including laundry) independently? No    Patient was asked if he/she:  Feels safe in current home environment?: Yes  Uses seatbelt? Yes  Sees the dentist regularly? Yes  Exercises regularly: Yes  Suffers from depression, stress, anger, loneliness or social isolation, pain, suicidality? No    Dietary issues discussed: Yes  Cognitive Impairment No  Hearing difficulties: No  Visual Acuity assessed: No    What is your self-assessment of overall health status and life satisfaction? Good     5 minutes spent on SDOH screening:   Specifically Housing, Food Insecurity,  Utilities and Transportatin Needs were evaluated   (See Screenings in Rooming section for documentation)  Food Insecurity: No Food Insecurity (12/3/2024)    Hunger Vital Sign     Worried About Running Out of Food in the Last Year: Never true     Ran Out of Food in the Last Year: Never true     Housing Stability: Unknown (12/3/2024)    Housing Stability Vital Sign     Unable to Pay for Housing in the Last Year: No     Number of Times Moved in the Last Year: Not on file     Homeless in the Last Year: No     Transportation Needs: No Transportation Needs (12/3/2024)    PRAPARE - Transportation     Lack of Transportation (Medical): No     Lack of Transportation (Non-Medical): No         Allergies and Medications  Penicillin g, Penicillins, Cipro [ciprofloxacin hcl], Clindamycin, Doxycycline, and Sulfa (sulfonamide antibiotics)  Current Outpatient Medications   Medication Instructions    ascorbic acid (VITAMIN C) 1,000 mg, Daily    carBAMazepine (TegretoL) 200 mg tablet Take 1 in the morning and 1/2 tablet at night    cholecalciferol (VITAMIN D-3) 50 mcg, Every other day    coenzyme Q-10 200 mg    cranberry fruit concentrate 500 mg capsule Take by mouth.    cyanocobalamin (VITAMIN B-12) 1,000 mcg, Daily    estradiol (Estrace) 0.01 % (0.1 mg/gram) vaginal cream APPLY A PEA-SIZED AMOUNT TO VAGINAL OPENING EVERY MONDAY, WEDNESDAY, AND FRIDAY EVENING.Insert into the vagina.    flaxseed oiL 1,000 mg    glutathione, bulk, 100 % powder Take by mouth. CAPSULE. USE AS DIRECTED.    hydrocortisone (PROCTOSOL HC TOP) 1 Application, 4 times daily PRN    L. acidophilus/Bifid. animalis 32 billion cell capsule Take by mouth.    metoprolol succinate XL (TOPROL-XL) 50 mg, oral, Daily    multivitamin capsule 1 capsule, Daily RT    NON FORMULARY Marine lipid concentrate 240-360-5 MG-MG-Unit caps    NON FORMULARY Cylospine in [place]ity 0.1-25% compound BID    ondansetron (ZOFRAN) 4 mg, oral, Every 8 hours PRN    OXcarbazepine (TRILEPTAL) 150  mg, oral, 2 times daily    polyethylene glycol (GLYCOLAX, MIRALAX) 17 g, oral, Daily, Mix 1 cap (17g) into 8 ounces of fluid.    psyllium (Metamucil) 3.4 gram packet Take by mouth.    zinc gluconate,zinc picolinate 30 mg, Daily    zoledronic acid (Reclast) 5 mg/100 mL piggyback Infuse into a venous catheter.    zoster vaccine-recombinant adjuvanted (Shingrix) 50 mcg/0.5 mL vaccine 0.5 mL, intramuscular, Once       Medications and Supplements  prescribed by me and other practitioners or clinical pharmacist (such as prescriptions, OTC's, herbal therapies and supplements) were reviewed and documented in the medical record.      Active Problem List  Patient Active Problem List   Diagnosis    Cataract, acquired    Disorder of bone density and structure, unspecified    Diverticulosis of colon    Dry eye syndrome of both eyes    Grade I hemorrhoids    H/O supraventricular tachycardia    History of colon polyps    History of shingles    Lumbar spondylosis    Macrocytosis    MVP (mitral valve prolapse)    Postmenopausal atrophic vaginitis    Recurrent UTI    Stress incontinence in female    Trigeminus neuralgia    Vitamin D deficiency    Routine adult health maintenance    Hyperlipidemia, mixed    Prophylactic antibiotic    Advanced directives, counseling/discussion    Cardiac risk counseling    Screening for multiple conditions    Encounter for screening mammogram for breast cancer    Alcohol screening    Encounter for screening involving social determinants of health (SDoH)    Leukopenia    Encounter for gynecological examination without abnormal finding    Menopause    Dilation of biliary tract       Comprehensive Medical/Surgical/Social/Family History  Past Medical History:   Diagnosis Date    H/O bone density study 10/02/2023    Lowest T score -1.8.    10-year Fracture Risk: Major Osteoporotic Fracture  19.6 Hip Fracture                        11.6    H/O bone density study     10/2021: -1.9. FRAX      FRAX* 10-year  Probability of Fracture     Based on femoral neck BMD: DualFemur (Left)     Major Osteoporotic Fracture: 18.4%     Hip Fracture:        10.1%     3/15: Osteopenia. Major osteoporotic fracture 17%. Hip fracture 3.0%     4/17: -1.9. 10-year absolute fracture risk:            - major osteoporotic fracture = 14.9 %            - hip fracture = 3.7 %4/17:    H/O bone density study     10/19: Ten Year Major Osteoporotic Fracture Risk: 10.43%      Ten Year Hip Fracture Risk: 2.3%      T-Score: -1.9    H/O CT scan     10/19: CT calcium score =0    H/O CT scan of abdomen     2/15: Rectus abdominis diastasis. No ventral abdominal hernia. Partially calcified     splenic artery aneurysm measuring 0.9 cm is stable since May 2006.     4/18: Colonic diverticulosis.    H/O echocardiogram     11/20: Normal EF%, trace MR/TR     2014: MVP with Mild [1+] eccentric MR, trace AL/TR     10/18: There is mild (1+ - 2+) mitral valve regurgitation due to prolapse likely related      to myxomatous degenerative disease. There is a posteriorly directed regurgitant      jet. There is moderate thickening of the anterior mitral leaflet. There is prolapse of the     anterior mitral leaflet.    H/O magnetic resonance imaging 11/20/2023    MRI Pancreas:2. No definite pancreatic mass or ductal dilatation. 3. Diffuse biliary dilatation likely related to chronic cholecystectomy status considering lack of additional findings to indicate biliary obstruction. Specifically no biliary stricture, choledocholithiasis, or extrinsic compression is identified. Suggest correlation with LFTs. If clinical concern for biliary obstruction,ERCP    H/O x-ray of lumbar spine     09/2018 : Moderate multilevel degenerative disc narrowing with slight progression     compared to prior at the L2 -L5 levels. Facet arthrosis is most     prominent at L5-s1 bilaterally, L4-L5 on the left, and L3-L4 on the right     Past Surgical History:   Procedure Laterality Date    BLADDER  SURGERY  2014    Bladder Surgery    CATARACT EXTRACTION  08/15/2018    Cataract Surgery    COLONOSCOPY      : diverticula were found in the sigmoid colon. External hemorrhoids.     10/18: Patent functional end-to-end ileo-colonic anastomosis,               characterized by healthy appearing mucosa.               - Moderate diverticulosis in the sigmoid colon, in the               descending colon and in the transverse colon. There was    evidence of an impacted diverticulum. External hemorrhoids    COLONOSCOPY  10/05/2023    · Extensive diverticulosis of severe severity causing severe luminal narrowing in the transverse colon, descending colon and sigmoid colon · Healthy ileocolonic anastomosis in the hepatic flexure · Small hemorrhoids    GALLBLADDER SURGERY  10/04/2013    Gallbladder Surgery    HEMICOLECTOMY  08/15/2018    lap right hemicolectomy bc polyp was in appendix, LNs negative    HERNIA REPAIR  08/15/2018    Hernia Repair    OTHER SURGICAL HISTORY  2014    Mid-Urethral Sling Operation    OTHER SURGICAL HISTORY  2019    Blepharoplasty    OTHER SURGICAL HISTORY  2014    Laparoscopy Partial Colectomy     Social History     Social History Narrative    Family History     · M: Pacemaker ( age 92); hip fracture s/p fall down stairs         F:  MVA young age        B: Pancreatic CA, cirrhosis, ETOH ( 70)        S: 1/2 sister. Colon CA ()        no biologic living siblings         Social History     ·  (2015), lives with BF        Retired, Director of [employer]        3 kids       · Former smoker, Quit in ,  smoked 2-3 years     ·  No ETOH use at all                  Tobacco/Alcohol/Opioid use, as well as Illicit Drug Use was screened for/reviewed and documented in Social Documentation section of the chart and medication list as appropriate     Depression Screening  Depression screening completed using the PHQ-2 questions, with results  documented in the chart/encounter (5 min spent on this).  (See Rooming Screening section for documentation, and/or progress note for additional information)    Cardiac Risk Assessment (15 minutes spent on this)  The 10-year ASCVD risk score (Andrez GOLDSMITH, et al., 2019) is: 16.7%    Values used to calculate the score:      Age: 79 years      Sex: Female      Is Non- : No      Diabetic: No      Tobacco smoker: No      Systolic Blood Pressure: 98 mmHg      Is BP treated: Yes      HDL Cholesterol: 90.5 mg/dL      Total Cholesterol: 283 mg/dL    Cardiovascular risk was discussed and, if needed, lifestyle modifications recommended, including nutritional choices, exercise, and elimination of habits contributing to risk. We agreed on a plan to reduce the current cardiovascular risk based on above discussion as needed.     Aspirin use/disuse was discussed and documented in the Problem List of the medical record (under Cardiac Risk Counseling) after reviewing the updated guidelines below:  Consider low dose Aspirin ( mg) use if the benefit for cardiovascular disease prevention outweighs risk for bleeding complications.   Discussed that in general, low dose ASA should be considered:  In patients WITHOUT prior MI/stroke/PAD (primary prevention):   a. Age <60: Use if 10-year cardiovascular disease risk >20%, with discussion of risks and benefits with patient  b. Age 60-<70: Use if 10-year cardiovascular disease risk >20% and low bleeding (e.g., gastrointestinal) risk, with discussion of risks and benefits with patient  c. Age >=70: Do not use    In patients WITH prior MI/stroke/PAD (secondary prevention):   Generally use unless extremely high bleeding (e.g., gastrointenstinal) risk, with discussion of risks and benefits with patient    Advance Directives Discussion  Advanced Care Planning (including a Living Will, Healthcare POA, as well as specific end of life choices and/or directives), was discussed  with the patient and/or surrogate, voluntarily, and details of that discussion documented in the Problem List (under Advanced Directives Discussion) of the medical record.   (16 min spent discussing above)     During the course of the visit the patient was educated and counseled about age appropriate screening and preventive services.   Completed preventive screenings were documented in the chart (see Routine Health Maintenance in Problem List) and orders were placed for outstanding screenings/procedures as documented in the Assessment and Plan.    Patient Instructions (the written plan) was given to the patient at check out as part of the AVS.

## 2024-12-03 NOTE — ASSESSMENT & PLAN NOTE
Annual Wellness exam completed   Preventive Health History reviewed  Ordered:   Labs    Mammogram   BMD   Shingrix at pharmacy  RSV discussed

## 2024-12-05 DIAGNOSIS — G50.0 TRIGEMINUS NEURALGIA: ICD-10-CM

## 2024-12-05 RX ORDER — OXCARBAZEPINE 150 MG/1
150 TABLET, FILM COATED ORAL 2 TIMES DAILY
Qty: 60 TABLET | Refills: 11 | Status: SHIPPED | OUTPATIENT
Start: 2024-12-05 | End: 2025-12-05

## 2024-12-05 RX ORDER — OXCARBAZEPINE 150 MG/1
150 TABLET, FILM COATED ORAL 2 TIMES DAILY
Qty: 60 TABLET | Refills: 11 | Status: CANCELLED | OUTPATIENT
Start: 2024-12-05 | End: 2025-12-05

## 2024-12-20 DIAGNOSIS — K64.8 OTHER HEMORRHOIDS: ICD-10-CM

## 2024-12-20 RX ORDER — HYDROCORTISONE 25 MG/G
CREAM TOPICAL 4 TIMES DAILY PRN
Qty: 28 G | Refills: 3 | Status: SHIPPED | OUTPATIENT
Start: 2024-12-20

## 2024-12-27 ENCOUNTER — HOSPITAL ENCOUNTER (OUTPATIENT)
Dept: RADIOLOGY | Facility: CLINIC | Age: 79
Discharge: HOME | End: 2024-12-27
Payer: MEDICARE

## 2024-12-27 DIAGNOSIS — K83.8 DILATION OF BILIARY TRACT: ICD-10-CM

## 2024-12-27 DIAGNOSIS — R11.0 NAUSEA: ICD-10-CM

## 2024-12-27 PROCEDURE — A9575 INJ GADOTERATE MEGLUMI 0.1ML: HCPCS | Performed by: INTERNAL MEDICINE

## 2024-12-27 PROCEDURE — 74183 MRI ABD W/O CNTR FLWD CNTR: CPT

## 2024-12-27 PROCEDURE — 2550000001 HC RX 255 CONTRASTS: Performed by: INTERNAL MEDICINE

## 2024-12-27 RX ORDER — GADOTERATE MEGLUMINE 376.9 MG/ML
10 INJECTION INTRAVENOUS
Status: COMPLETED | OUTPATIENT
Start: 2024-12-27 | End: 2024-12-27

## 2025-01-03 PROBLEM — Z71.89 ADVANCED DIRECTIVES, COUNSELING/DISCUSSION: Status: RESOLVED | Noted: 2023-10-22 | Resolved: 2025-01-03

## 2025-01-03 PROBLEM — N39.0 RECURRENT UTI: Status: RESOLVED | Noted: 2023-03-20 | Resolved: 2025-01-03

## 2025-01-03 PROBLEM — Z71.89 CARDIAC RISK COUNSELING: Status: RESOLVED | Noted: 2023-10-22 | Resolved: 2025-01-03

## 2025-01-03 PROBLEM — M19.039 WRIST ARTHRITIS: Status: ACTIVE | Noted: 2024-11-13

## 2025-01-03 PROBLEM — R10.33 UMBILICAL PAIN: Status: ACTIVE | Noted: 2025-01-03

## 2025-01-03 PROBLEM — H92.02 OTALGIA OF LEFT EAR: Status: ACTIVE | Noted: 2025-01-03

## 2025-01-03 PROBLEM — Z86.19 HISTORY OF SHINGLES: Status: RESOLVED | Noted: 2023-03-20 | Resolved: 2025-01-03

## 2025-01-03 PROBLEM — K43.2 INCISIONAL HERNIA: Status: ACTIVE | Noted: 2025-01-03

## 2025-01-03 PROBLEM — N39.3 STRESS INCONTINENCE IN FEMALE: Status: RESOLVED | Noted: 2023-03-20 | Resolved: 2025-01-03

## 2025-01-03 PROBLEM — H61.20 IMPACTED CERUMEN: Status: ACTIVE | Noted: 2025-01-03

## 2025-01-03 PROBLEM — H26.9 CATARACT, ACQUIRED: Status: RESOLVED | Noted: 2023-03-20 | Resolved: 2025-01-03

## 2025-01-03 PROBLEM — J01.90 ACUTE SINUSITIS: Status: ACTIVE | Noted: 2025-01-03

## 2025-01-08 ENCOUNTER — APPOINTMENT (OUTPATIENT)
Dept: GASTROENTEROLOGY | Facility: CLINIC | Age: 80
End: 2025-01-08
Payer: MEDICARE

## 2025-01-08 VITALS
DIASTOLIC BLOOD PRESSURE: 78 MMHG | HEIGHT: 65 IN | SYSTOLIC BLOOD PRESSURE: 159 MMHG | RESPIRATION RATE: 16 BRPM | TEMPERATURE: 96.9 F | BODY MASS INDEX: 18.49 KG/M2 | HEART RATE: 89 BPM | WEIGHT: 111 LBS

## 2025-01-08 DIAGNOSIS — K59.09 OTHER CONSTIPATION: Primary | ICD-10-CM

## 2025-01-08 PROCEDURE — 99214 OFFICE O/P EST MOD 30 MIN: CPT | Performed by: INTERNAL MEDICINE

## 2025-01-08 PROCEDURE — 1123F ACP DISCUSS/DSCN MKR DOCD: CPT | Performed by: INTERNAL MEDICINE

## 2025-01-08 PROCEDURE — 1157F ADVNC CARE PLAN IN RCRD: CPT | Performed by: INTERNAL MEDICINE

## 2025-01-08 ASSESSMENT — ENCOUNTER SYMPTOMS
WEIGHT GAIN: 0
HEMATOCHEZIA: 0
SHORTNESS OF BREATH: 0
ABDOMINAL PAIN: 1
FEVER: 0
WEIGHT LOSS: 0
DIARRHEA: 0
VOMITING: 0
CONSTIPATION: 1
HEARTBURN: 0
CHILLS: 0
NAUSEA: 0
COUGH: 0
HEMATEMESIS: 0
BLOATING: 1
FLATUS: 1
DECREASED APPETITE: 0

## 2025-01-08 NOTE — PROGRESS NOTES
REASON FOR VISIT:  constipation    HPI:  Joanna Kendrick is a 79 y.o. female who presents for    Pt here to discuss pain and constipation that occurred around 3 weeks ago after eating different foods during the holidays - she did not have a BM for 10 days  She took miralax once which helped resolve constipation . Pain resolved after a bowel movement. She continues to have gas, bloating and pressure but resolves with a BM  She was having nausea after having COVID - overall improving - takes zofran twice a week now  MRCP in December 2023   She is currently moving bowels every 2-3 days - stool is soft. She takes miralax as needed.   Denies BRB, melena, or mucous          Med list notable for    Labs notable for    No fhx of CRC.       Prev endoscopic eval:   >> Colonoscopy 10/2023 - Dr. Lia Emerson  Findings  Multiple extensive severe diverticula with no inflammation causing severe luminal narrowing (traversable) in the transverse colon, descending colon and sigmoid colon  Healthy ileocolonic anastomosis in the hepatic flexure  External small hemorrhoids observed during retroflexion       REVIEW OF SYSTEMS    Review of Systems   Constitutional: Negative for chills, decreased appetite, fever, weight gain and weight loss.   Respiratory:  Negative for cough and shortness of breath.    Gastrointestinal:  Positive for bloating, abdominal pain, constipation and flatus. Negative for diarrhea, dysphagia, heartburn, hematemesis, hematochezia, melena, nausea and vomiting.          Allergies   Allergen Reactions    Penicillin G Anaphylaxis    Penicillins Anaphylaxis    Cipro [Ciprofloxacin Hcl] Unknown    Clindamycin Unknown    Doxycycline Diarrhea and GI Upset    Sulfa (Sulfonamide Antibiotics) Hives       Past Medical History:   Diagnosis Date    H/O bone density study 10/02/2023    Lowest T score -1.8.    10-year Fracture Risk: Major Osteoporotic Fracture  19.6 Hip Fracture                        11.6    H/O bone density  study     10/2021: -1.9. FRAX      FRAX* 10-year Probability of Fracture     Based on femoral neck BMD: DualFemur (Left)     Major Osteoporotic Fracture: 18.4%     Hip Fracture:        10.1%     3/15: Osteopenia. Major osteoporotic fracture 17%. Hip fracture 3.0%     4/17: -1.9. 10-year absolute fracture risk:            - major osteoporotic fracture = 14.9 %            - hip fracture = 3.7 %4/17:    H/O bone density study     10/19: Ten Year Major Osteoporotic Fracture Risk: 10.43%      Ten Year Hip Fracture Risk: 2.3%      T-Score: -1.9    H/O CT scan     10/19: CT calcium score =0    H/O CT scan of abdomen     2/15: Rectus abdominis diastasis. No ventral abdominal hernia. Partially calcified     splenic artery aneurysm measuring 0.9 cm is stable since May 2006.     4/18: Colonic diverticulosis.    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 There is a posteriorly directed regurgitant      jet. There is moderate thickening of the anterior mitral leaflet. There is prolapse of the     anterior mitral leaflet.    H/O magnetic resonance imaging 11/20/2023    MRI Pancreas:2. No definite pancreatic mass or ductal dilatation. 3. Diffuse biliary dilatation likely related to chronic cholecystectomy status considering lack of additional findings to indicate biliary obstruction. Specifically no biliary stricture, choledocholithiasis, or extrinsic compression is identified. Suggest correlation with LFTs. If clinical concern for biliary obstruction,ERCP    H/O magnetic resonance imaging 01/02/2025    MRI pancreas: 1.  Moderate diffuse biliary dilatation is unchanged compared to prior MRI greater than 1 year ago. As before, there are no additional findings to indicate biliary obstruction such as stricture, choledocholithiasis, or  extrinsic compression. This likely relates to chronic cholecystectomy status. 2. Overall stable evaluation of the abdomen without new abnormalities    H/O x-ray of lumbar spine     2018 : Moderate multilevel degenerative disc narrowing with slight progression     compared to prior at the L2 -L5 levels. Facet arthrosis is most     prominent at L5-s1 bilaterally, L4-L5 on the left, and L3-L4 on the right       Past Surgical History:   Procedure Laterality Date    BLADDER SURGERY  2014    Bladder Surgery    CATARACT EXTRACTION  08/15/2018    Cataract Surgery    COLONOSCOPY      : diverticula were found in the sigmoid colon. External hemorrhoids.     10/18: Patent functional end-to-end ileo-colonic anastomosis,               characterized by healthy appearing mucosa.               - Moderate diverticulosis in the sigmoid colon, in the               descending colon and in the transverse colon. There was    evidence of an impacted diverticulum. External hemorrhoids    COLONOSCOPY  10/05/2023    · Extensive diverticulosis of severe severity causing severe luminal narrowing in the transverse colon, descending colon and sigmoid colon · Healthy ileocolonic anastomosis in the hepatic flexure · Small hemorrhoids    GALLBLADDER SURGERY  10/04/2013    Gallbladder Surgery    HEMICOLECTOMY  08/15/2018    lap right hemicolectomy bc polyp was in appendix, LNs negative    HERNIA REPAIR  08/15/2018    Hernia Repair    OTHER SURGICAL HISTORY  2014    Mid-Urethral Sling Operation    OTHER SURGICAL HISTORY  2019    Blepharoplasty    OTHER SURGICAL HISTORY  2014    Laparoscopy Partial Colectomy       Family History   Problem Relation Name Age of Onset    Other (Pacemaker) Mother           age 92    Hip fracture Mother          s/p fall down stairs    Other (MVA-  young age) Father      Alcohol abuse Brother      Pancreatic cancer Brother           70    Cirrhosis Brother      Colon cancer  Half-Sister         Social History     Tobacco Use    Smoking status: Never    Smokeless tobacco: Never   Substance Use Topics    Alcohol use: Never       Current Outpatient Medications   Medication Sig Dispense Refill    ascorbic acid (Vitamin C) 1,000 mg tablet Take 1 tablet (1,000 mg) by mouth once daily.      carBAMazepine (TegretoL) 200 mg tablet Take 1 in the morning and 1/2 tablet at night 45 tablet 11    cholecalciferol (Vitamin D-3) 50 mcg (2,000 unit) capsule Take 1 capsule (50 mcg) by mouth every other day.      coenzyme Q-10 200 mg capsule Take 1 capsule (200 mg) by mouth. TAKE AS DIRECTED.      cranberry fruit concentrate 500 mg capsule Take by mouth.      cyanocobalamin (Vitamin B-12) 1,000 mcg tablet Take 1 tablet (1,000 mcg) by mouth once daily.      estradiol (Estrace) 0.01 % (0.1 mg/gram) vaginal cream APPLY A PEA-SIZED AMOUNT TO VAGINAL OPENING EVERY MONDAY, WEDNESDAY, AND FRIDAY EVENING.Insert into the vagina. 42.5 g 3    flaxseed oiL 1,000 mg capsule Take 1 capsule (1,000 mg) by mouth. TAKE AS DIRECTED.      glutathione, bulk, 100 % powder Take by mouth. CAPSULE. USE AS DIRECTED.      hydrocortisone (Proctosol HC) 2.5 % rectal cream Insert into the rectum 4 times a day as needed for hemorrhoids. 28 g 3    L. acidophilus/Bifid. animalis 32 billion cell capsule Take by mouth.      metoprolol succinate XL (Toprol-XL) 50 mg 24 hr tablet TAKE 1 TABLET BY MOUTH DAILY 90 tablet 3    multivitamin capsule Take 1 capsule by mouth once daily.      NON FORMULARY Marine lipid concentrate 240-360-5 MG-MG-Unit caps      NON FORMULARY Cylospine in Klarity 0.1-25% compound BID      ondansetron (Zofran) 4 mg tablet Take 1 tablet (4 mg) by mouth every 8 hours if needed for nausea or vomiting. 90 tablet 3    OXcarbazepine (TrileptaL) 150 mg tablet Take 1 tablet (150 mg) by mouth 2 times a day. 60 tablet 11    polyethylene glycol (Glycolax, Miralax) 17 gram packet Take 17 g by mouth once daily. Mix 1 cap (17g) into  8 ounces of fluid. 30 packet 3    psyllium (Metamucil) 3.4 gram packet Take by mouth.      zinc gluconate-zinc picolinate 30 mg capsule Take 30 mg by mouth once daily.      zoledronic acid (Reclast) 5 mg/100 mL piggyback Infuse into a venous catheter.       No current facility-administered medications for this visit.       PHYSICAL EXAM:  LMP  (LMP Unknown)      Physical Exam  Constitutional:       Comments: Awake   HENT:      Head: Normocephalic.   Cardiovascular:      Rate and Rhythm: Normal rate and regular rhythm.   Pulmonary:      Effort: Pulmonary effort is normal.      Breath sounds: Normal breath sounds.   Abdominal:      General: Bowel sounds are normal.      Palpations: Abdomen is soft.   Neurological:      Mental Status: She is alert.   Psychiatric:         Mood and Affect: Mood normal.          ASSESSMENT  79-year-old female presents for evaluation after recent episode of severe constipation.  She reports around the holidays she had a change in her overall dietary habits.  She was eating less fiber more carbohydrates.  She had an episode of constipation where she did not have a bowel movement for 10 days.  She reports she took 1 dose of MiraLAX but had a lot of straining in order to have a bowel movement.  Her bowel movements are back to her baseline which is 1 every 2 to 3 days but she still continues to have some left lower quadrant discomfort that is resolved with a bowel movement.  Her last colonoscopy was in 2023 and unremarkable.  She had a lot of diverticuli in the colon making it difficult and therefore no further colonoscopies were recommended.  At this point I would recommend her taking MiraLAX on a more consistent basis.  We discussed taking 1 teaspoon in 8 ounces of water or juice daily to keep more water in the colon and minimize constipation.  She will tailor as needed.    She was diagnosed with COVID in October 2024.  She started having nausea soon after that.  Overall it has been  improving.  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  She is now taking Zofran only about twice a week.  I would continue conservative management.  She can follow-up as needed as well.      Evaluation requested by Dr. Jessa Osborne MD.   My final recommendations will be communicated back to the requesting physician by way of shared Medical record or letter to requesting physician via fax.      Attending Note:   I have personally performed a face to face assessment of this Patient, which included an interview, physical exam and Assessment and Plan.  I have reviewed and confirmed the history and key findings as documented by the Medical Assistant and edited as appropriate.     Signature: Lia Emerson MD    Date: 1/8/2025  Time: 2:29 PM

## 2025-02-05 ENCOUNTER — PATIENT MESSAGE (OUTPATIENT)
Dept: PRIMARY CARE | Facility: CLINIC | Age: 80
End: 2025-02-05
Payer: MEDICARE

## 2025-02-05 ENCOUNTER — HOSPITAL ENCOUNTER (OUTPATIENT)
Dept: RADIOLOGY | Facility: CLINIC | Age: 80
Discharge: HOME | End: 2025-02-05
Payer: MEDICARE

## 2025-02-05 DIAGNOSIS — Z13.6 SCREENING FOR CARDIOVASCULAR CONDITION: ICD-10-CM

## 2025-02-05 DIAGNOSIS — E78.2 HYPERLIPIDEMIA, MIXED: Primary | ICD-10-CM

## 2025-02-05 DIAGNOSIS — E78.2 HYPERLIPIDEMIA, MIXED: ICD-10-CM

## 2025-02-05 PROBLEM — R93.1 ABNORMAL SCREENING CARDIAC CT: Status: ACTIVE | Noted: 2025-02-05

## 2025-02-05 PROCEDURE — 75571 CT HRT W/O DYE W/CA TEST: CPT

## 2025-02-06 RX ORDER — ROSUVASTATIN CALCIUM 10 MG/1
10 TABLET, COATED ORAL 2 TIMES WEEKLY
Qty: 24 TABLET | Refills: 3 | Status: SHIPPED | OUTPATIENT
Start: 2025-02-06 | End: 2026-02-06

## 2025-02-10 ENCOUNTER — TELEPHONE (OUTPATIENT)
Dept: NEUROLOGY | Facility: CLINIC | Age: 80
End: 2025-02-10
Payer: MEDICARE

## 2025-02-10 NOTE — TELEPHONE ENCOUNTER
Pt states that her pain medication is not working very long. 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 She would just like to know if there are any suggestions you might have to help with the pain. Please advise

## 2025-02-12 NOTE — TELEPHONE ENCOUNTER
Spoke with the pt to give her the new instructions. She said she really appreciates you and that she will call back in a couple of weeks to let us know how it is working for her.

## 2025-02-20 NOTE — TELEPHONE ENCOUNTER
Pt states the carbamazepine 100 mg chewable tablets is working but it is upsetting her stomach. She is only taking 2/day instead of the 3. She would like to know what to do? Please advise.   Yes

## 2025-03-18 DIAGNOSIS — E78.2 HYPERLIPIDEMIA, MIXED: ICD-10-CM

## 2025-03-27 DIAGNOSIS — Z91.89: ICD-10-CM

## 2025-04-01 LAB
ALT SERPL-CCNC: 13 U/L (ref 6–29)
BASOPHILS # BLD AUTO: 46 CELLS/UL (ref 0–200)
BASOPHILS NFR BLD AUTO: 0.6 %
CHOLEST SERPL-MCNC: 202 MG/DL
CHOLEST/HDLC SERPL: 2.2 (CALC)
EOSINOPHIL # BLD AUTO: 123 CELLS/UL (ref 15–500)
EOSINOPHIL NFR BLD AUTO: 1.6 %
ERYTHROCYTE [DISTWIDTH] IN BLOOD BY AUTOMATED COUNT: 11.9 % (ref 11–15)
HCT VFR BLD AUTO: 36.2 % (ref 35–45)
HDLC SERPL-MCNC: 93 MG/DL
HGB BLD-MCNC: 12.1 G/DL (ref 11.7–15.5)
LDLC SERPL CALC-MCNC: 86 MG/DL (CALC)
LYMPHOCYTES # BLD AUTO: 1502 CELLS/UL (ref 850–3900)
LYMPHOCYTES NFR BLD AUTO: 19.5 %
MCH RBC QN AUTO: 32.4 PG (ref 27–33)
MCHC RBC AUTO-ENTMCNC: 33.4 G/DL (ref 32–36)
MCV RBC AUTO: 96.8 FL (ref 80–100)
MEV IGG SER IA-ACNC: >300 AU/ML
MONOCYTES # BLD AUTO: 531 CELLS/UL (ref 200–950)
MONOCYTES NFR BLD AUTO: 6.9 %
MUV IGG SER IA-ACNC: >300 AU/ML
NEUTROPHILS # BLD AUTO: 5498 CELLS/UL (ref 1500–7800)
NEUTROPHILS NFR BLD AUTO: 71.4 %
NONHDLC SERPL-MCNC: 109 MG/DL (CALC)
PLATELET # BLD AUTO: 332 THOUSAND/UL (ref 140–400)
PMV BLD REES-ECKER: 10.1 FL (ref 7.5–12.5)
RBC # BLD AUTO: 3.74 MILLION/UL (ref 3.8–5.1)
RUBV IGG SERPL IA-ACNC: 14.1 INDEX
TRIGL SERPL-MCNC: 133 MG/DL
WBC # BLD AUTO: 7.7 THOUSAND/UL (ref 3.8–10.8)

## 2025-04-08 ENCOUNTER — APPOINTMENT (OUTPATIENT)
Dept: OPHTHALMOLOGY | Facility: CLINIC | Age: 80
End: 2025-04-08
Payer: MEDICARE

## 2025-04-08 DIAGNOSIS — H16.223 KERATITIS SICCA, BILATERAL: Primary | ICD-10-CM

## 2025-04-08 PROCEDURE — 92002 INTRM OPH EXAM NEW PATIENT: CPT | Performed by: OPTOMETRIST

## 2025-04-08 PROCEDURE — 83516 IMMUNOASSAY NONANTIBODY: CPT | Performed by: OPTOMETRIST

## 2025-04-08 PROCEDURE — 83861 MICROFLUID ANALY TEARS: CPT | Performed by: OPTOMETRIST

## 2025-04-08 RX ORDER — PERFLUOROHEXYLOCTANE 1 MG/MG
1 SOLUTION OPHTHALMIC 4 TIMES DAILY
Qty: 3 ML | Refills: 11 | Status: SHIPPED | OUTPATIENT
Start: 2025-04-08 | End: 2026-04-08

## 2025-04-08 ASSESSMENT — ENCOUNTER SYMPTOMS
CARDIOVASCULAR NEGATIVE: 0
RESPIRATORY NEGATIVE: 0
ALLERGIC/IMMUNOLOGIC NEGATIVE: 0
NEUROLOGICAL NEGATIVE: 0
GASTROINTESTINAL NEGATIVE: 0
PSYCHIATRIC NEGATIVE: 0
MUSCULOSKELETAL NEGATIVE: 0
CONSTITUTIONAL NEGATIVE: 0
ENDOCRINE NEGATIVE: 0
EYES NEGATIVE: 1
HEMATOLOGIC/LYMPHATIC NEGATIVE: 0

## 2025-04-08 ASSESSMENT — VISUAL ACUITY
OD_SC: 20/50
OS_PH_SC: 20/25-2
OS_SC: 20/40
METHOD: SNELLEN - LINEAR
OD_PH_SC: 20/30

## 2025-04-08 ASSESSMENT — CONF VISUAL FIELD
OD_INFERIOR_TEMPORAL_RESTRICTION: 0
OS_INFERIOR_NASAL_RESTRICTION: 0
OD_SUPERIOR_TEMPORAL_RESTRICTION: 0
OS_INFERIOR_TEMPORAL_RESTRICTION: 0
OS_SUPERIOR_TEMPORAL_RESTRICTION: 0
OS_SUPERIOR_NASAL_RESTRICTION: 0
METHOD: COUNTING FINGERS
OD_NORMAL: 1
OS_NORMAL: 1
OD_SUPERIOR_NASAL_RESTRICTION: 0
OD_INFERIOR_NASAL_RESTRICTION: 0

## 2025-04-08 ASSESSMENT — EXTERNAL EXAM - LEFT EYE: OS_EXAM: NORMAL

## 2025-04-08 ASSESSMENT — SCHIRMERS TEST
OD_SCHIRMERS: 2
OS_SCHIRMERS: 1

## 2025-04-08 ASSESSMENT — EXTERNAL EXAM - RIGHT EYE: OD_EXAM: NORMAL

## 2025-04-08 NOTE — PROGRESS NOTES
Testing to evaluate the presence of dry eye disease (DED) was performed today and provided the following results:  The ocular surface disease index questionnaire (OSDI) score was  (scale: 0-12 = normal, 13-22 = mild, 23-32 = moderate, >33 = severe):   TearLab, a test for tear film osmolarity revealed (normal <300 mOsm/L, mild 300-320, moderate 320-340, severe >340 mOsm/L, inter eye difference of >8 mOsm/L is considered abnormal as well)  OD:  307 mOsm/L  OS:  304 mOsm/L  Inflammadry, a test for the DED specific MMP-9 inhibitor:  OD:  v m positive   OS:  v m positive  Schirmer's test with anesthetic, testing basal tear production (0-5 = severe, 6-10 = moderate, 11-15 = mild):  OD:  2 mm between 1-6 minutes  OS:  1 mm between 1-6 minutes  Tear break up time (TBUT), a measure of tear stability (0-5 = severe, 6-10 = moderate, 11-15 = mild)  OD: 4  seconds  OS:  4 seconds  Corneal punctate epithelial erosions (PEE) staining with sodium fluorescein score (5 zones, each PEE level 0-3, maximum score = 15)  OD: NIH PEE score:  +1 INTCS Central PEE present  OS: NIH PEE score:  +1 INTC Central PEE present     Corneal scarring and irregular surface OD>OS.     Blepharitis: Meibomain gland disease (Effron scale 0-4, 0:no abnormality, 4: thick creamy yellow expression at all gland orifices, expression continuous, conjunctival redness). Collarettes (0-4, 0: 0-2 lashes with collarettes, 1: 3-10, 2: >10 but <1/3rd of lashes, 3: >1/3rd to <2/3rd of lashes, 4: >2/3rd of lashes.  Right eye (OD): stage 2 collarettes: stage 0  Left eye (OS): stage 2 collarettes: stage 0     dry eye eval OU, patient Klarity BID OU (chondroitin),  PF refresh ATs 4-5 day OU, and systane gel QHS OU, not using warm compress.    Can DC Klarity as this is expensive.  Start XIIDRA BID to [facility]  Start Miebo QID can use BID as well. To [facility]  Resume WC BID 5-10 minutes.    Rtc 2 months for all dry eye tests

## 2025-04-16 ENCOUNTER — DOCUMENTATION (OUTPATIENT)
Dept: OPHTHALMOLOGY | Facility: CLINIC | Age: 80
End: 2025-04-16
Payer: MEDICARE

## 2025-04-16 DIAGNOSIS — H04.123 DRY EYE SYNDROME OF BOTH EYES: Primary | ICD-10-CM

## 2025-04-16 DIAGNOSIS — H16.223 KERATITIS SICCA, BILATERAL: ICD-10-CM

## 2025-04-16 RX ORDER — PERFLUOROHEXYLOCTANE 1 MG/MG
1 SOLUTION OPHTHALMIC 4 TIMES DAILY
Qty: 3 ML | Refills: 11 | Status: SHIPPED | OUTPATIENT
Start: 2025-04-16 | End: 2025-05-16

## 2025-04-16 NOTE — PROGRESS NOTES
Rx for Miebo and XIIDRA sent to [facility] If not covered patient can continue with current regimen.

## 2025-05-13 ENCOUNTER — TELEPHONE (OUTPATIENT)
Dept: PRIMARY CARE | Facility: CLINIC | Age: 80
End: 2025-05-13
Payer: MEDICARE

## 2025-05-13 DIAGNOSIS — Z79.2 PROPHYLACTIC ANTIBIOTIC: ICD-10-CM

## 2025-05-13 DIAGNOSIS — Z79.2 NEED FOR ANTIBIOTIC PROPHYLAXIS FOR DENTAL PROCEDURE: ICD-10-CM

## 2025-05-13 RX ORDER — AZITHROMYCIN 250 MG/1
500 TABLET, FILM COATED ORAL ONCE
Qty: 2 TABLET | Refills: 2 | Status: SHIPPED | OUTPATIENT
Start: 2025-05-13 | End: 2025-05-13

## 2025-05-13 NOTE — TELEPHONE ENCOUNTER
"Pt called asking for refill on her Azithromycin 250 mg  she is having a dental procedure and is out of them    Refill Request      Last OV with PCP 12/3/2024     Future Appointments       Date / Time Provider Department Dept Phone    6/16/2025 11:00 AM (Arrive by 10:45 AM) AUGUSTIN [id number] 1 [facility] [phone]    6/18/2025 9:30 AM Ki Moreno [facility] [phone]    7/29/2025 10:30 AM Angelina Parker MD [facility] [phone]    9/25/2025 10:50 AM Alfredo Reddy MD [facility] [phone]    10/3/2025 11:15 AM (Arrive by 11:00 AM) ELY [id number] DXA 1 [facility] [phone]    12/5/2025 11:00 AM (Arrive by 10:45 AM) Jessa Osborne MD [facility] [phone]          No results found for: \"HGBA1C\"  Lab Results   Component Value Date    CHOL 202 (H) 03/31/2025    CHOL 283 (H) 11/25/2024    CHOL 263 (H) 11/13/2023     Lab Results   Component Value Date    HDL 93 03/31/2025    HDL 90.5 11/25/2024    HDL 86.6 11/13/2023     Lab Results   Component Value Date    LDLCALC 86 03/31/2025    LDLCALC 172 (H) 11/25/2024    LDLCALC 157 (H) 11/13/2023     Lab Results   Component Value Date    TRIG 133 03/31/2025    TRIG 103 11/25/2024    TRIG 99 11/13/2023     No components found for: \"CHOLHDL\"  Lab Results   Component Value Date    TSH 2.77 11/25/2024     Lab Results   Component Value Date    GLUCOSE 93 11/25/2024    CALCIUM 9.9 11/25/2024     11/25/2024    K 4.4 11/25/2024    CO2 29 11/25/2024     11/25/2024    BUN 10 11/25/2024    CREATININE 0.73 11/25/2024     "

## 2025-05-16 ENCOUNTER — TELEMEDICINE (OUTPATIENT)
Dept: PRIMARY CARE | Facility: CLINIC | Age: 80
End: 2025-05-16
Payer: MEDICARE

## 2025-05-16 ENCOUNTER — TELEPHONE (OUTPATIENT)
Dept: PRIMARY CARE | Facility: CLINIC | Age: 80
End: 2025-05-16
Payer: MEDICARE

## 2025-05-16 DIAGNOSIS — R39.9 UTI SYMPTOMS: Primary | ICD-10-CM

## 2025-05-16 DIAGNOSIS — N10 ACUTE PYELONEPHRITIS: ICD-10-CM

## 2025-05-16 RX ORDER — NITROFURANTOIN 25; 75 MG/1; MG/1
100 CAPSULE ORAL 2 TIMES DAILY
Qty: 14 CAPSULE | Refills: 0 | Status: SHIPPED | OUTPATIENT
Start: 2025-05-16 | End: 2025-05-23

## 2025-05-16 NOTE — PROGRESS NOTES
An interactive audio only telecommunication system which permits real time communications between the patient (at the originating site) and provider (at the distant site) was utilized to provide this telehealth service.    Verbal consent was requested and obtained from the patient for this telehealth visit.  We have confirmed the patient wishes to see me, Radhika Whitehead, a board certified family nurse practitioner with an active [facility] license as well as verified the patient's identity and physical location in [place].    A total of 10 minutes was spent providing direct care to this patient  A total of 2 minutes was spent pre-charting and post-charting     Problem List Items Addressed This Visit    None  Visit Diagnoses         UTI symptoms    -  Primary    empiric macrobid  will go to lab today for urine specimens  prn fu  followed by Dr Reddy    Relevant Medications    nitrofurantoin, macrocrystal-monohydrate, (Macrobid) 100 mg capsule             Subjective   Patient ID: Joanna Kendrick is a 79 y.o. female who presents for No chief complaint on file..  HPI  Urinating more frequently x 2 days  Urgency  No fever  No hematuria  No low back pain, abd pain  No hx stones    Urine Culture >100,000 Klebsiella pneumoniae/variicola Abnormal         Resulting Agency: [facility]     Susceptibility     Klebsiella pneumoniae/variicola     MICROSCAN    Amoxicillin/Clavulanate Susceptible    Ampicillin Resistant    Ampicillin/Sulbactam Susceptible    Cefazolin Resistant    Cefazolin (uncomplicated UTIs only) Susceptible    Ceftriaxone Susceptible    Ciprofloxacin Resistant    Gentamicin Susceptible    Nitrofurantoin Resistant    Piperacillin/Tazobactam Susceptible    Trimethoprim/Sulfamethoxazole Resistant         URINE CULTURE,BACTERIAL                      FINAL     02/08/23 10:48                    ISOLATE1 : Escherichia coli        >100,000 CFU/ML      ____________________________________________   Organism                  E coli  "            Antibiotic                 BP      INTRP      ____________________________________________   Ampicillin                        S          Ceftriaxone                        S          Cefazolin                          S          Ciprofloxacin                      S          Nitrofurantoin                     S          Gentamicin                         S          Levofloxacin                       S          Piperc/Tazobact                    S          Trimeth/Sulfa                      S          ______________________________________   Lab Results   Component Value Date    GLUCOSE 93 11/25/2024    CALCIUM 9.9 11/25/2024     11/25/2024    K 4.4 11/25/2024    CO2 29 11/25/2024     11/25/2024    BUN 10 11/25/2024    CREATININE 0.73 11/25/2024       Review of Systems   All other systems reviewed and are negative.      BP Readings from Last 3 Encounters:   01/08/25 159/78   12/03/24 98/66   10/14/24 131/68      Wt Readings from Last 3 Encounters:   01/08/25 50.3 kg (111 lb)   12/27/24 49 kg (108 lb)   12/03/24 49.2 kg (108 lb 8 oz)      BMI:   Estimated body mass index is 18.47 kg/m² as calculated from the following:    Height as of 1/8/25: 1.651 m (5' 5\").    Weight as of 1/8/25: 50.3 kg (111 lb).    Objective   Physical Exam  Speaking in complete sentences  No distress     "

## 2025-05-16 NOTE — TELEPHONE ENCOUNTER
Patient thinks she has a UTI. She was asking for lab orders. I called and left VM telling patient that the lab could be ordered but no medication can be sent unless she is seen. Told patient to call office for VV and also relayed if she did not do appointment and felt like she needs an antibiotic she would have to go to a UC over the weekend per DG.

## 2025-05-18 LAB
APPEARANCE UR: CLEAR
BACTERIA #/AREA URNS HPF: ABNORMAL /HPF
BACTERIA UR CULT: NORMAL
BILIRUB UR QL STRIP: NEGATIVE
COLOR UR: YELLOW
GLUCOSE UR QL STRIP: NEGATIVE
HGB UR QL STRIP: NEGATIVE
HYALINE CASTS #/AREA URNS LPF: ABNORMAL /LPF
KETONES UR QL STRIP: NEGATIVE
LEUKOCYTE ESTERASE UR QL STRIP: ABNORMAL
NITRITE UR QL STRIP: NEGATIVE
PH UR STRIP: 6.5 [PH] (ref 5–8)
PROT UR QL STRIP: NEGATIVE
RBC #/AREA URNS HPF: ABNORMAL /HPF
SERVICE CMNT-IMP: ABNORMAL
SP GR UR STRIP: 1.01 (ref 1–1.03)
SQUAMOUS #/AREA URNS HPF: ABNORMAL /HPF
WBC #/AREA URNS HPF: ABNORMAL /HPF

## 2025-05-23 ENCOUNTER — OFFICE VISIT (OUTPATIENT)
Dept: PRIMARY CARE | Facility: CLINIC | Age: 80
End: 2025-05-23
Payer: MEDICARE

## 2025-05-23 VITALS
SYSTOLIC BLOOD PRESSURE: 130 MMHG | HEART RATE: 89 BPM | TEMPERATURE: 97.4 F | BODY MASS INDEX: 17.99 KG/M2 | WEIGHT: 108 LBS | OXYGEN SATURATION: 98 % | DIASTOLIC BLOOD PRESSURE: 80 MMHG | HEIGHT: 65 IN

## 2025-05-23 DIAGNOSIS — E78.2 HYPERLIPIDEMIA, MIXED: ICD-10-CM

## 2025-05-23 DIAGNOSIS — N95.2 POSTMENOPAUSAL ATROPHIC VAGINITIS: ICD-10-CM

## 2025-05-23 DIAGNOSIS — R39.9 UTI SYMPTOMS: Primary | ICD-10-CM

## 2025-05-23 LAB
POC APPEARANCE, URINE: CLEAR
POC BILIRUBIN, URINE: NEGATIVE
POC BLOOD, URINE: NEGATIVE
POC COLOR, URINE: ABNORMAL
POC GLUCOSE, URINE: NEGATIVE MG/DL
POC KETONES, URINE: NEGATIVE MG/DL
POC LEUKOCYTES, URINE: NEGATIVE
POC NITRITE,URINE: NEGATIVE
POC PH, URINE: 7 PH
POC PROTEIN, URINE: NEGATIVE MG/DL
POC SPECIFIC GRAVITY, URINE: 1.01
POC UROBILINOGEN, URINE: 0.2 EU/DL

## 2025-05-23 PROCEDURE — 99213 OFFICE O/P EST LOW 20 MIN: CPT | Performed by: NURSE PRACTITIONER

## 2025-05-23 PROCEDURE — 1036F TOBACCO NON-USER: CPT | Performed by: NURSE PRACTITIONER

## 2025-05-23 PROCEDURE — G2211 COMPLEX E/M VISIT ADD ON: HCPCS | Performed by: NURSE PRACTITIONER

## 2025-05-23 PROCEDURE — 81002 URINALYSIS NONAUTO W/O SCOPE: CPT | Performed by: NURSE PRACTITIONER

## 2025-05-23 PROCEDURE — 1159F MED LIST DOCD IN RCRD: CPT | Performed by: NURSE PRACTITIONER

## 2025-05-23 NOTE — ASSESSMENT & PLAN NOTE
Still with c/o dryness and UTI like sx  Could try either v-magic or bonafide  Would try to avoid unnecessary abx with c/o UTI symptoms as she's has multiple neg cx

## 2025-05-23 NOTE — PROGRESS NOTES
"Problem List Items Addressed This Visit          Medium    Hyperlipidemia, mixed    Overview   2019: Coronary artery calcium score of 0   Managed with diet/exercise;         Postmenopausal atrophic vaginitis    Overview   Description: Managed with Topical ET Sees Urology          Current Assessment & Plan   Still with c/o dryness and UTI like sx  Could try either v-magic or bonafide  Would try to avoid unnecessary abx with c/o UTI symptoms as she's has multiple neg cx           Other Visit Diagnoses         UTI symptoms    -  Primary    just completing macrobid from SR  cult was mixed soy  disc atrophic vaginitis as opposed to UTI  wishes to repeat cx to ensure no bacteria    Relevant Orders    POCT UA (nonautomated) manually resulted (Completed)    Urine Culture    Urinalysis with Reflex Microscopic             Subjective   Patient ID: Joanna Kendrick is a 79 y.o. female who presents for UTI (Started 2 weeks ago /Was having previous urgency and frequency /No pressure/No blood/).  HPI  She is on macrobid  Has one more dose of macrobid  Drinks a lot of water   No fever  No hematuria  Urine is clear  No low abd or back pain  No vaginal discharge     5/16/25:   Mixed genital soy isolated. These superficial                        bacteria are not indicative of a urinary tract                        infection.     Review of Systems   All other systems reviewed and are negative.      BP Readings from Last 3 Encounters:   05/23/25 130/80   01/08/25 159/78   12/03/24 98/66      Wt Readings from Last 3 Encounters:   05/23/25 49 kg (108 lb)   01/08/25 50.3 kg (111 lb)   12/27/24 49 kg (108 lb)      BMI:   Estimated body mass index is 17.97 kg/m² as calculated from the following:    Height as of this encounter: 1.651 m (5' 5\").    Weight as of this encounter: 49 kg (108 lb).    Objective   Physical Exam  Constitutional:       General: She is not in acute distress.  HENT:      Head: Normocephalic and atraumatic.      " Nose: Nose normal.      Mouth/Throat:      Mouth: Mucous membranes are moist.   Eyes:      Extraocular Movements: Extraocular movements intact.      Conjunctiva/sclera: Conjunctivae normal.   Pulmonary:      Effort: Pulmonary effort is normal.   Musculoskeletal:         General: Normal range of motion.      Cervical back: Normal range of motion and neck supple.   Neurological:      General: No focal deficit present.      Mental Status: She is alert.   Psychiatric:         Mood and Affect: Mood normal.

## 2025-05-24 LAB
AMORPH SED URNS QL MICRO: ABNORMAL /HPF
APPEARANCE UR: ABNORMAL
BACTERIA #/AREA URNS HPF: ABNORMAL /HPF
BILIRUB UR QL STRIP: NEGATIVE
COLOR UR: YELLOW
GLUCOSE UR QL STRIP: NEGATIVE
HGB UR QL STRIP: NEGATIVE
HYALINE CASTS #/AREA URNS LPF: ABNORMAL /LPF
KETONES UR QL STRIP: NEGATIVE
LEUKOCYTE ESTERASE UR QL STRIP: ABNORMAL
NITRITE UR QL STRIP: NEGATIVE
PH UR STRIP: 7 [PH] (ref 5–8)
PROT UR QL STRIP: NEGATIVE
RBC #/AREA URNS HPF: ABNORMAL /HPF
SERVICE CMNT-IMP: ABNORMAL
SP GR UR STRIP: 1.01 (ref 1–1.03)
SQUAMOUS #/AREA URNS HPF: ABNORMAL /HPF
WBC #/AREA URNS HPF: ABNORMAL /HPF

## 2025-05-25 LAB — BACTERIA UR CULT: NORMAL

## 2025-06-06 ENCOUNTER — APPOINTMENT (OUTPATIENT)
Dept: RADIOLOGY | Facility: CLINIC | Age: 80
End: 2025-06-06
Payer: MEDICARE

## 2025-06-16 ENCOUNTER — APPOINTMENT (OUTPATIENT)
Dept: RADIOLOGY | Facility: CLINIC | Age: 80
End: 2025-06-16
Payer: MEDICARE

## 2025-06-18 ENCOUNTER — APPOINTMENT (OUTPATIENT)
Dept: OPHTHALMOLOGY | Facility: CLINIC | Age: 80
End: 2025-06-18
Payer: MEDICARE

## 2025-06-23 ENCOUNTER — HOSPITAL ENCOUNTER (OUTPATIENT)
Dept: RADIOLOGY | Facility: CLINIC | Age: 80
Discharge: HOME | End: 2025-06-23
Payer: MEDICARE

## 2025-06-23 VITALS — WEIGHT: 108 LBS | BODY MASS INDEX: 17.36 KG/M2 | HEIGHT: 66 IN

## 2025-06-23 DIAGNOSIS — Z12.31 ENCOUNTER FOR SCREENING MAMMOGRAM FOR BREAST CANCER: ICD-10-CM

## 2025-06-23 PROCEDURE — 77067 SCR MAMMO BI INCL CAD: CPT | Performed by: STUDENT IN AN ORGANIZED HEALTH CARE EDUCATION/TRAINING PROGRAM

## 2025-06-23 PROCEDURE — 77063 BREAST TOMOSYNTHESIS BI: CPT | Performed by: STUDENT IN AN ORGANIZED HEALTH CARE EDUCATION/TRAINING PROGRAM

## 2025-06-23 PROCEDURE — 77063 BREAST TOMOSYNTHESIS BI: CPT

## 2025-07-08 ENCOUNTER — APPOINTMENT (OUTPATIENT)
Dept: NEUROLOGY | Facility: CLINIC | Age: 80
End: 2025-07-08
Payer: MEDICARE

## 2025-07-29 ENCOUNTER — APPOINTMENT (OUTPATIENT)
Dept: NEUROLOGY | Facility: CLINIC | Age: 80
End: 2025-07-29
Payer: MEDICARE

## 2025-09-11 ENCOUNTER — APPOINTMENT (OUTPATIENT)
Dept: NEUROLOGY | Facility: CLINIC | Age: 80
End: 2025-09-11
Payer: MEDICARE

## 2025-09-25 ENCOUNTER — APPOINTMENT (OUTPATIENT)
Dept: UROLOGY | Facility: CLINIC | Age: 80
End: 2025-09-25
Payer: MEDICARE

## 2025-10-30 ENCOUNTER — APPOINTMENT (OUTPATIENT)
Dept: NEUROLOGY | Facility: CLINIC | Age: 80
End: 2025-10-30
Payer: MEDICARE

## 2025-12-05 ENCOUNTER — APPOINTMENT (OUTPATIENT)
Dept: PRIMARY CARE | Facility: CLINIC | Age: 80
End: 2025-12-05
Payer: MEDICARE